# Patient Record
Sex: MALE | Race: WHITE | NOT HISPANIC OR LATINO | Employment: FULL TIME | URBAN - METROPOLITAN AREA
[De-identification: names, ages, dates, MRNs, and addresses within clinical notes are randomized per-mention and may not be internally consistent; named-entity substitution may affect disease eponyms.]

---

## 2017-01-09 ENCOUNTER — GENERIC CONVERSION - ENCOUNTER (OUTPATIENT)
Dept: OTHER | Facility: OTHER | Age: 57
End: 2017-01-09

## 2017-01-09 ENCOUNTER — APPOINTMENT (OUTPATIENT)
Dept: PHYSICAL THERAPY | Facility: CLINIC | Age: 57
End: 2017-01-09
Payer: COMMERCIAL

## 2017-01-09 PROCEDURE — 97162 PT EVAL MOD COMPLEX 30 MIN: CPT

## 2017-01-09 PROCEDURE — 97110 THERAPEUTIC EXERCISES: CPT

## 2017-01-09 PROCEDURE — 97140 MANUAL THERAPY 1/> REGIONS: CPT

## 2017-01-12 ENCOUNTER — APPOINTMENT (OUTPATIENT)
Dept: PHYSICAL THERAPY | Facility: CLINIC | Age: 57
End: 2017-01-12
Payer: COMMERCIAL

## 2017-01-16 ENCOUNTER — APPOINTMENT (OUTPATIENT)
Dept: PHYSICAL THERAPY | Facility: CLINIC | Age: 57
End: 2017-01-16
Payer: COMMERCIAL

## 2017-01-19 ENCOUNTER — APPOINTMENT (OUTPATIENT)
Dept: PHYSICAL THERAPY | Facility: CLINIC | Age: 57
End: 2017-01-19
Payer: COMMERCIAL

## 2017-01-19 PROCEDURE — 97110 THERAPEUTIC EXERCISES: CPT

## 2017-01-19 PROCEDURE — 97112 NEUROMUSCULAR REEDUCATION: CPT

## 2017-01-19 PROCEDURE — 97140 MANUAL THERAPY 1/> REGIONS: CPT

## 2017-01-23 ENCOUNTER — APPOINTMENT (OUTPATIENT)
Dept: PHYSICAL THERAPY | Facility: CLINIC | Age: 57
End: 2017-01-23
Payer: COMMERCIAL

## 2017-01-23 PROCEDURE — 97110 THERAPEUTIC EXERCISES: CPT

## 2017-01-23 PROCEDURE — 97140 MANUAL THERAPY 1/> REGIONS: CPT

## 2017-01-26 ENCOUNTER — APPOINTMENT (OUTPATIENT)
Dept: PHYSICAL THERAPY | Facility: CLINIC | Age: 57
End: 2017-01-26
Payer: COMMERCIAL

## 2017-01-26 PROCEDURE — 97140 MANUAL THERAPY 1/> REGIONS: CPT

## 2017-01-26 PROCEDURE — 97110 THERAPEUTIC EXERCISES: CPT

## 2017-01-30 ENCOUNTER — APPOINTMENT (OUTPATIENT)
Dept: PHYSICAL THERAPY | Facility: CLINIC | Age: 57
End: 2017-01-30
Payer: COMMERCIAL

## 2017-01-30 PROCEDURE — 97140 MANUAL THERAPY 1/> REGIONS: CPT

## 2017-01-30 PROCEDURE — 97110 THERAPEUTIC EXERCISES: CPT

## 2017-02-02 ENCOUNTER — APPOINTMENT (OUTPATIENT)
Dept: PHYSICAL THERAPY | Facility: CLINIC | Age: 57
End: 2017-02-02
Payer: COMMERCIAL

## 2017-02-06 ENCOUNTER — APPOINTMENT (OUTPATIENT)
Dept: PHYSICAL THERAPY | Facility: CLINIC | Age: 57
End: 2017-02-06
Payer: COMMERCIAL

## 2017-02-06 PROCEDURE — 97110 THERAPEUTIC EXERCISES: CPT

## 2017-02-06 PROCEDURE — 97140 MANUAL THERAPY 1/> REGIONS: CPT

## 2017-02-09 ENCOUNTER — APPOINTMENT (OUTPATIENT)
Dept: PHYSICAL THERAPY | Facility: CLINIC | Age: 57
End: 2017-02-09
Payer: COMMERCIAL

## 2017-02-10 ENCOUNTER — APPOINTMENT (OUTPATIENT)
Dept: PHYSICAL THERAPY | Facility: CLINIC | Age: 57
End: 2017-02-10
Payer: COMMERCIAL

## 2017-02-10 PROCEDURE — 97140 MANUAL THERAPY 1/> REGIONS: CPT

## 2017-02-10 PROCEDURE — 97110 THERAPEUTIC EXERCISES: CPT

## 2017-02-13 ENCOUNTER — APPOINTMENT (OUTPATIENT)
Dept: PHYSICAL THERAPY | Facility: CLINIC | Age: 57
End: 2017-02-13
Payer: COMMERCIAL

## 2017-02-16 ENCOUNTER — APPOINTMENT (OUTPATIENT)
Dept: PHYSICAL THERAPY | Facility: CLINIC | Age: 57
End: 2017-02-16
Payer: COMMERCIAL

## 2017-03-15 ENCOUNTER — GENERIC CONVERSION - ENCOUNTER (OUTPATIENT)
Dept: OTHER | Facility: OTHER | Age: 57
End: 2017-03-15

## 2017-05-15 DIAGNOSIS — Z12.5 ENCOUNTER FOR SCREENING FOR MALIGNANT NEOPLASM OF PROSTATE: ICD-10-CM

## 2017-05-15 DIAGNOSIS — R73.09 OTHER ABNORMAL GLUCOSE: ICD-10-CM

## 2017-05-23 ENCOUNTER — LAB CONVERSION - ENCOUNTER (OUTPATIENT)
Dept: OTHER | Facility: OTHER | Age: 57
End: 2017-05-23

## 2017-05-23 LAB
A/G RATIO (HISTORICAL): 2.2 (CALC) (ref 1–2.5)
ALBUMIN SERPL BCP-MCNC: 4.7 G/DL (ref 3.6–5.1)
ALP SERPL-CCNC: 78 U/L (ref 40–115)
ALT SERPL W P-5'-P-CCNC: 19 U/L (ref 9–46)
AST SERPL W P-5'-P-CCNC: 19 U/L (ref 10–35)
BASOPHILS # BLD AUTO: 0.4 %
BASOPHILS # BLD AUTO: 33 CELLS/UL (ref 0–200)
BILIRUB SERPL-MCNC: 1.4 MG/DL (ref 0.2–1.2)
BILIRUB UR QL STRIP: NEGATIVE
BUN SERPL-MCNC: 24 MG/DL (ref 7–25)
BUN/CREA RATIO (HISTORICAL): ABNORMAL (CALC) (ref 6–22)
CALCIUM SERPL-MCNC: 9.5 MG/DL (ref 8.6–10.3)
CHLORIDE SERPL-SCNC: 103 MMOL/L (ref 98–110)
CHOLEST SERPL-MCNC: 164 MG/DL (ref 125–200)
CHOLEST/HDLC SERPL: 2.5 (CALC)
CO2 SERPL-SCNC: 27 MMOL/L (ref 20–31)
COLOR UR: YELLOW
COMMENT (HISTORICAL): CLEAR
CREAT SERPL-MCNC: 1.26 MG/DL (ref 0.7–1.33)
DEPRECATED RDW RBC AUTO: 13.9 % (ref 11–15)
EGFR AFRICAN AMERICAN (HISTORICAL): 72 ML/MIN/1.73M2
EGFR-AMERICAN CALC (HISTORICAL): 62 ML/MIN/1.73M2
EOSINOPHIL # BLD AUTO: 0.8 %
EOSINOPHIL # BLD AUTO: 66 CELLS/UL (ref 15–500)
FECAL OCCULT BLOOD DIAGNOSTIC (HISTORICAL): NEGATIVE
GAMMA GLOBULIN (HISTORICAL): 2.1 G/DL (CALC) (ref 1.9–3.7)
GLUCOSE (HISTORICAL): 82 MG/DL (ref 65–99)
GLUCOSE (HISTORICAL): NEGATIVE
HBA1C MFR BLD HPLC: 4.8 % OF TOTAL HGB
HCT VFR BLD AUTO: 50.2 % (ref 38.5–50)
HDLC SERPL-MCNC: 66 MG/DL
HGB BLD-MCNC: 16.7 G/DL (ref 13.2–17.1)
KETONES UR STRIP-MCNC: ABNORMAL MG/DL
LDL CHOLESTEROL (HISTORICAL): 89 MG/DL (CALC)
LEUKOCYTE ESTERASE UR QL STRIP: NEGATIVE
LYMPHOCYTES # BLD AUTO: 1960 CELLS/UL (ref 850–3900)
LYMPHOCYTES # BLD AUTO: 23.9 %
MCH RBC QN AUTO: 29.6 PG (ref 27–33)
MCHC RBC AUTO-ENTMCNC: 33.2 G/DL (ref 32–36)
MCV RBC AUTO: 89 FL (ref 80–100)
MONOCYTES # BLD AUTO: 541 CELLS/UL (ref 200–950)
MONOCYTES (HISTORICAL): 6.6 %
NEUTROPHILS # BLD AUTO: 5601 CELLS/UL (ref 1500–7800)
NEUTROPHILS # BLD AUTO: 68.3 %
NITRITE UR QL STRIP: NEGATIVE
NON-HDL-CHOL (CHOL-HDL) (HISTORICAL): 98 MG/DL (CALC)
PH UR STRIP.AUTO: 5.5 [PH] (ref 5–8)
PLATELET # BLD AUTO: 208 THOUSAND/UL (ref 140–400)
PMV BLD AUTO: 8.1 FL (ref 7.5–12.5)
POTASSIUM SERPL-SCNC: 4.3 MMOL/L (ref 3.5–5.3)
PROSTATE SPECIFIC ANTIGEN TOTAL (HISTORICAL): 0.1 NG/ML
PROT UR STRIP-MCNC: NEGATIVE MG/DL
RBC # BLD AUTO: 5.64 MILLION/UL (ref 4.2–5.8)
SODIUM SERPL-SCNC: 141 MMOL/L (ref 135–146)
SP GR UR STRIP.AUTO: 1.02 (ref 1–1.03)
TOTAL PROTEIN (HISTORICAL): 6.8 G/DL (ref 6.1–8.1)
TRIGL SERPL-MCNC: 43 MG/DL
WBC # BLD AUTO: 8.2 THOUSAND/UL (ref 3.8–10.8)

## 2017-06-26 ENCOUNTER — GENERIC CONVERSION - ENCOUNTER (OUTPATIENT)
Dept: OTHER | Facility: OTHER | Age: 57
End: 2017-06-26

## 2017-08-14 ENCOUNTER — ALLSCRIPTS OFFICE VISIT (OUTPATIENT)
Dept: OTHER | Facility: OTHER | Age: 57
End: 2017-08-14

## 2017-11-21 ENCOUNTER — ALLSCRIPTS OFFICE VISIT (OUTPATIENT)
Dept: OTHER | Facility: OTHER | Age: 57
End: 2017-11-21

## 2017-11-22 NOTE — PROGRESS NOTES
Assessment    1  Acute bacterial bronchitis (466 0,041 9) (J20 8,B96 89)    Plan  Acute bacterial bronchitis    · Benzonatate 200 MG Oral Capsule; ONE PO Q8H PRN COUGH   · DrRx Zithromax Z-Nestor 250 MG #6 pill pack; 2 TABLETS PO TODAY AND ONETABLET PO DAILY THEREAFTER FOR 4 DAYS    Discussion/Summary    Acute bacterial bronchitis: Plan is to start Zithromax Z-Nestor and benzonatate for symptom relief  Medications were explained  Nidia Harman was asked to call back for any questions or problems and to expect improvement in about 2 days and then resolution of symptoms  Chief Complaint    1  Cough  patient c/o dry cough for two days      History of Present Illness  Cough: Kimberly Orta presents with complaints of cough  Associated symptoms include myalgias, but-- no dyspnea,-- no wheezing,-- no chills,-- no fever,-- no runny nose,-- no stuffy nose,-- no sore throat,-- no pleuritic chest pain,-- no chest pain,-- no vomiting,-- no heartburn,-- no postnasal drainage,-- no mouth breathing,-- no noisy breathing,-- no rapid breathing,-- no painful swallowing,-- no eye itching,-- no nose itching,-- no headache,-- no hemoptysis-- and-- no night sweats  HPI: Fatou Mcnally is here today with a 2 day history of increasing cough, without wheezing or shortness of breath without fever chills  He has had no nasal congestion, sinus congestion, sore throat or earache  He has no gastrointestinal complaints  He reports a loose cough but no sputum productionhe develops a bronchitis this time of year and usually probably response to Zithromax  Active Problems  1  Depression with anxiety (300 4) (F41 8)    Past Medical History  Active Problems And Past Medical History Reviewed: The active problems and past medical history were reviewed and updated today  Social History     · Former smoker (L45 88) (K37 103)  The social history was reviewed and updated today  The social history was reviewed and is unchanged  Current Meds   1   ClonazePAM 0 5 MG Oral Tablet; Therapy: [id number] to Recorded   2  Nortriptyline HCl - 50 MG Oral Capsule; TAKE 2 CAPSULES AT BEDTIME; Therapy: 21VMN1667 to Recorded    The medication list was reviewed and updated today  Allergies  1  No Known Drug Allergies    Vitals   Recorded: 21Nov2017 10:01AM   Temperature 98 F, Tympanic   Heart Rate 86   Systolic 861, LUE, Sitting   Diastolic 78, LUE, Sitting   Height 5 ft 8 in   Weight 186 lb 4 oz   BMI Calculated 28 32   BSA Calculated 1 98   O2 Saturation 98       Physical Exam   Constitutional Vital signs stable, afebrile in no acute distress with occasional short paroxysms of coughing  There is no dyspnea  Pulses regular  Cardiac exam is normal  Lungs are clear, other than rhonchi when he coughs  There are no focal decreased breath sounds  There is no supraclavicular adenopathy  ENT exam is also unremarkable  Future Appointments    Date/Time Provider Specialty Site   08/16/2018 03:20 PM LISANDRA Zee   Internal Medicine [facility]       Signatures   Electronically signed by : LISANDRA Murphy ; Nov 21 2017 10:14AM EST                       (Author)

## 2017-12-28 ENCOUNTER — ALLSCRIPTS OFFICE VISIT (OUTPATIENT)
Dept: OTHER | Facility: OTHER | Age: 57
End: 2017-12-28

## 2017-12-28 DIAGNOSIS — C61 MALIGNANT NEOPLASM OF PROSTATE (HCC): ICD-10-CM

## 2017-12-28 LAB
BILIRUB UR QL STRIP: NEGATIVE
CLARITY UR: NORMAL
COLOR UR: YELLOW
GLUCOSE (HISTORICAL): NEGATIVE
HGB UR QL STRIP.AUTO: NEGATIVE
KETONES UR STRIP-MCNC: NEGATIVE MG/DL
LEUKOCYTE ESTERASE UR QL STRIP: NEGATIVE
NITRITE UR QL STRIP: NEGATIVE
PH UR STRIP.AUTO: 5 [PH]
PROT UR STRIP-MCNC: NEGATIVE MG/DL
SP GR UR STRIP.AUTO: 1015
UROBILINOGEN UR QL STRIP.AUTO: 0.2
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
## 2018-01-12 NOTE — PROGRESS NOTES
Assessment    1  Encounter for preventive health examination (V70 0) (Z00 00)    Plan  PMH: Muscle strain of left upper back    · Naproxen 500 MG Oral Tablet  PMH: Strain of trapezius muscle, left, initial encounter    · Cyclobenzaprine HCl - 10 MG Oral Tablet    Discussion/Summary    Arturo is encouraged to continue his optimal lifestyle, and he continues once a year visits with his psychiatrist, with well compensated generalized anxiety including clonazepam therapy by psychiatry  He has good support from his wife since he lost his job and a good several package  I asked him to contact me if he is having a trouble with this transition  Echo was in one year  Chief Complaint  pt is here for annual physical      History of Present Illness  HPI: Lexy Anders is here for general preventative exam  He recently lost his job working for an [employer]  He has a good severance package and his wife is working full-time  He is a bit uneasy about this transition period but is coping well  He has good support from his wife  He is going to the gym regularly and eating well  He is up-to-date on preventive care including seeing his urologist twice year with history of ostial cancer in remission with a May 22 PSA of 0 1  He sees [facility] twice a year as his father has a history of melanoma  Lexy Anders is not had melanoma skin cancer  He wears sunscreen  He reports stable vision is up-to-date on eye care once year  He is up-to-date on dental care  His last colonoscopy was 3 years ago and he is on a 5 year recall list and GI review of systems is negative  Respiratory lab work was reviewed including hemoglobin A1c of 4 8, total cholesterol 164 with LDL 43, normal CBC other than hematocrit 50 2 most likely due to fasting, glucose of 82, GFR 62 and normal urinalysis other than mild ketones  CMP was normal other than mild elevation of bilirubin  We reviewed vaccines and he received Adacel today   Flu shot should be given later in the immunization season as flu vaccine will wear off and given now  Arturo reports excellent exercise tolerance including no chest pain or shortness of breath  Review systems also as above, all others negative  Active Problems    1  Depression with anxiety (300 4) (F41 8)   2  Encounter for prostate cancer screening (V76 44) (Z12 5)    Past Medical History    · History of Adenocarcinoma of prostate (80) (C61)   · History of colonic polyps (V12 72) (Z86 010)   · History of squamous cell carcinoma of skin (V10 83) (W44 769)    Surgical History    · History of Complete Colonoscopy   · History of Dermatological Surgery   · History of Knee Surgery Right   · History of Prostatectomy Radical    Family History  Mother    · Family history of coronary artery disease (V17 3) (Z80 55)  Father    · Family history of coronary artery disease (V17 3) (Z82 49)   · Family history of Melanoma    Social History    · Former smoker (R53 27) (I97 466)    Current Meds   1  ClonazePAM 0 5 MG Oral Tablet; Therapy: 14BCK2683 to Recorded   2  Cyclobenzaprine HCl - 10 MG Oral Tablet; TAKE 1 TABLET AT BEDTIME; Therapy: 62DPZ9364 to ([phone])  Requested for: 70ONE3230; Last   Rx:19Jan2017 Ordered   3  Naproxen 500 MG Oral Tablet; take 1 tablet by mouth every 12 hours if needed; Therapy: 83OMM7688 to (Evaluate:29Mar2017)  Requested for: 09XQT2021; Last   Rx:15Mar2017 Ordered    Allergies    1  No Known Drug Allergies    Vitals   Recorded: 87Aky9253 03:24PM   Heart Rate 83   Systolic 062   Diastolic 82   Height 5 ft 8 in   Weight 181 lb    BMI Calculated 27 52   BSA Calculated 1 96   O2 Saturation 98     Physical Exam    Constitutional   General appearance: No acute distress, well appearing and well nourished  Head and Face   Head and face: Normal     Palpation of the face and sinuses: No sinus tenderness  Eyes   Conjunctiva and lids: No erythema, swelling or discharge      Pupils and irises: Equal, round, reactive to light  Ears, Nose, Mouth, and Throat   External inspection of ears and nose: Normal     Otoscopic examination: Tympanic membranes translucent with normal light reflex  Canals patent without erythema  Hearing: Normal     Nasal mucosa, septum, and turbinates: Normal without edema or erythema  Lips, teeth, and gums: Normal, good dentition  Oropharynx: Normal with no erythema, edema, exudate or lesions  Neck   Neck: Supple, symmetric, trachea midline, no masses  Thyroid: Normal, no thyromegaly  Pulmonary   Respiratory effort: No increased work of breathing or signs of respiratory distress  Auscultation of lungs: Clear to auscultation  Cardiovascular   Auscultation of heart: Normal rate and rhythm, normal S1 and S2, no murmurs  Carotid pulses: 2+ bilaterally  Abdominal aorta: Normal     Pedal pulses: 2+ bilaterally  Peripheral vascular exam: Normal     Examination of extremities for edema and/or varicosities: Normal     Abdomen   Abdomen: Non-tender, no masses  Liver and spleen: No hepatomegaly or splenomegaly  Examination for hernias: No hernias appreciated  Lymphatic   Palpation of lymph nodes in neck: No lymphadenopathy  Palpation of lymph nodes in other areas: No lymphadenopathy  Musculoskeletal   Gait and station: Normal     Inspection/palpation of digits and nails: Normal without clubbing or cyanosis  Well-healed scar over the medial aspect of the left anterior knee, with degeneration noted the medial compartment, but good stability  Range of motion: Normal     Stability: Normal     Muscle strength/tone: Normal     Skin Hypertrichosis and other signs of sun exposure of the trunk and extremities, with benign hemangiomas, benign nevi, no rash or skin malignancy noted  Neurologic   Cranial nerves: Cranial nerves 2-12 intact  Cortical function: Normal mental status  Sensation: No sensory loss      Coordination: Normal finger to nose and heel to pierre     Psychiatric   Judgment and insight: Normal     Orientation to person, place and time: Normal     Recent and remote memory: Intact      Mood and affect: Normal        Signatures   Electronically signed by : LISANDRA Diaz ; Aug 14 2017  4:10PM EST                       (Author)

## 2018-01-15 VITALS
SYSTOLIC BLOOD PRESSURE: 136 MMHG | DIASTOLIC BLOOD PRESSURE: 78 MMHG | HEIGHT: 68 IN | BODY MASS INDEX: 28.23 KG/M2 | HEART RATE: 86 BPM | WEIGHT: 186.25 LBS | OXYGEN SATURATION: 98 % | TEMPERATURE: 98 F

## 2018-01-22 VITALS
OXYGEN SATURATION: 98 % | WEIGHT: 181 LBS | HEART RATE: 83 BPM | BODY MASS INDEX: 27.43 KG/M2 | HEIGHT: 68 IN | SYSTOLIC BLOOD PRESSURE: 124 MMHG | DIASTOLIC BLOOD PRESSURE: 82 MMHG

## 2018-01-23 VITALS
HEIGHT: 69 IN | DIASTOLIC BLOOD PRESSURE: 86 MMHG | WEIGHT: 187 LBS | BODY MASS INDEX: 27.7 KG/M2 | SYSTOLIC BLOOD PRESSURE: 136 MMHG

## 2018-05-04 ENCOUNTER — OFFICE VISIT (OUTPATIENT)
Dept: INTERNAL MEDICINE CLINIC | Facility: CLINIC | Age: 58
End: 2018-05-04
Payer: COMMERCIAL

## 2018-05-04 VITALS
DIASTOLIC BLOOD PRESSURE: 88 MMHG | BODY MASS INDEX: 26.2 KG/M2 | SYSTOLIC BLOOD PRESSURE: 122 MMHG | HEART RATE: 87 BPM | OXYGEN SATURATION: 97 % | HEIGHT: 70 IN | WEIGHT: 183 LBS

## 2018-05-04 DIAGNOSIS — J20.9 ACUTE BRONCHITIS, UNSPECIFIED ORGANISM: Primary | ICD-10-CM

## 2018-05-04 PROCEDURE — 99213 OFFICE O/P EST LOW 20 MIN: CPT | Performed by: INTERNAL MEDICINE

## 2018-05-04 RX ORDER — AZITHROMYCIN 250 MG/1
TABLET, FILM COATED ORAL
Qty: 6 TABLET | Refills: 0 | Status: SHIPPED | OUTPATIENT
Start: 2018-05-04 | End: 2018-05-08

## 2018-05-04 RX ORDER — CLONAZEPAM 0.5 MG/1
TABLET ORAL
COMMUNITY
Start: 2015-05-12 | End: 2019-09-13 | Stop reason: SDUPTHER

## 2018-05-04 RX ORDER — BENZONATATE 100 MG/1
100 CAPSULE ORAL 3 TIMES DAILY PRN
Qty: 20 CAPSULE | Refills: 0 | Status: SHIPPED | OUTPATIENT
Start: 2018-05-04 | End: 2018-06-20

## 2018-05-04 RX ORDER — NORTRIPTYLINE HYDROCHLORIDE 50 MG/1
1 CAPSULE ORAL EVERY OTHER DAY
COMMUNITY
Start: 2017-11-21 | End: 2021-09-21

## 2018-05-04 NOTE — PROGRESS NOTES
Assessment/Plan:         Diagnoses and all orders for this visit:    Acute bronchitis, unspecified organism  -     azithromycin (ZITHROMAX) 250 mg tablet; 2 pill first day and one pill 2-5 th day  -     benzonatate (TESSALON PERLES) 100 mg capsule; Take 1 capsule (100 mg total) by mouth 3 (three) times a day as needed for cough  Increase fluid intake, rest, saline gargles, saline nasal irrigation, NSAID/tylenol as tolerated  Follow up if symptoms getting worse or seek immediate medical help for emergency  Pt understands the plan  Other orders  -     nortriptyline (PAMELOR) 50 mg capsule; Take 2 capsules by mouth  -     clonazePAM (KlonoPIN) 0 5 mg tablet; Take by mouth        Subjective:      Patient ID: Alec Polanco is a 59 y o  male  Cough   This is a new problem  The current episode started in the past 7 days  The problem has been gradually worsening  The problem occurs constantly  The cough is productive of sputum  Pertinent negatives include no chest pain, chills, ear congestion, fever, nasal congestion, shortness of breath, sweats or wheezing  The following portions of the patient's history were reviewed and updated as appropriate: allergies, current medications, past medical history, past social history and problem list     Review of Systems   Constitutional: Negative for chills and fever  Respiratory: Positive for cough  Negative for shortness of breath and wheezing  Cardiovascular: Negative for chest pain  Neurological: Negative for dizziness  Objective:      /88 (BP Location: Left arm, Patient Position: Sitting, Cuff Size: Standard)   Pulse 87   Ht 5' 9 6" (1 768 m)   Wt 83 kg (183 lb)   SpO2 97%   BMI 26 56 kg/m²          Physical Exam   Constitutional: He appears well-nourished  No distress  HENT:   Mouth/Throat: No oropharyngeal exudate     Throat erythematous   postnasal drainage  Negative facial pressure   Cardiovascular: Normal rate, regular rhythm and normal heart sounds  Pulmonary/Chest: Effort normal and breath sounds normal  No respiratory distress  He has no wheezes  He has no rales

## 2018-06-20 ENCOUNTER — OFFICE VISIT (OUTPATIENT)
Dept: INTERNAL MEDICINE CLINIC | Facility: CLINIC | Age: 58
End: 2018-06-20
Payer: COMMERCIAL

## 2018-06-20 VITALS
OXYGEN SATURATION: 98 % | HEIGHT: 70 IN | BODY MASS INDEX: 27.06 KG/M2 | DIASTOLIC BLOOD PRESSURE: 82 MMHG | SYSTOLIC BLOOD PRESSURE: 112 MMHG | HEART RATE: 72 BPM | WEIGHT: 189 LBS | TEMPERATURE: 98.5 F

## 2018-06-20 DIAGNOSIS — J20.9 ACUTE BRONCHITIS, UNSPECIFIED ORGANISM: Primary | ICD-10-CM

## 2018-06-20 PROCEDURE — 99213 OFFICE O/P EST LOW 20 MIN: CPT | Performed by: INTERNAL MEDICINE

## 2018-06-20 PROCEDURE — 3008F BODY MASS INDEX DOCD: CPT | Performed by: INTERNAL MEDICINE

## 2018-06-20 RX ORDER — AZITHROMYCIN 250 MG/1
TABLET, FILM COATED ORAL
Qty: 6 TABLET | Refills: 0 | Status: SHIPPED | OUTPATIENT
Start: 2018-06-20 | End: 2018-06-24

## 2018-06-20 RX ORDER — CEPHALEXIN 500 MG/1
CAPSULE ORAL
COMMUNITY
Start: 2018-06-14 | End: 2018-06-28 | Stop reason: ALTCHOICE

## 2018-06-20 RX ORDER — BENZONATATE 100 MG/1
100 CAPSULE ORAL 3 TIMES DAILY PRN
Qty: 20 CAPSULE | Refills: 0 | Status: SHIPPED | OUTPATIENT
Start: 2018-06-20 | End: 2018-08-16 | Stop reason: ALTCHOICE

## 2018-06-20 NOTE — PROGRESS NOTES
Assessment/Plan:    No problem-specific Assessment & Plan notes found for this encounter  Diagnoses and all orders for this visit:    Acute bronchitis, unspecified organism  -     azithromycin (ZITHROMAX) 250 mg tablet; 2 pill first day and one pill 2-5 th day  -     benzonatate (TESSALON PERLES) 100 mg capsule; Take 1 capsule (100 mg total) by mouth 3 (three) times a day as needed for cough      Increase fluid intake, rest, saline gargles, saline nasal irrigation, NSAID/tylenol as tolerated  Follow up if symptoms getting worse or seek immediate medical help for emergency  Pt understands the plan  Other orders  -     cephalexin (KEFLEX) 500 mg capsule;         Subjective:      Patient ID: Arleen Alejandro is a 59 y o  male  Cough   This is a new problem  Episode onset: 4 days ago  The problem has been gradually worsening  The problem occurs constantly  The cough is non-productive  Associated symptoms include postnasal drip and rhinorrhea  Pertinent negatives include no chest pain, chills, fever, sore throat, shortness of breath or wheezing  Associated symptoms comments:  Feels  tired  The following portions of the patient's history were reviewed and updated as appropriate: allergies, current medications, past medical history, past social history, past surgical history and problem list     Review of Systems   Constitutional: Positive for fatigue  Negative for chills and fever  HENT: Positive for congestion, postnasal drip and rhinorrhea  Negative for sore throat  Respiratory: Positive for cough  Negative for shortness of breath and wheezing  Cardiovascular: Negative for chest pain  Gastrointestinal: Negative for diarrhea  Neurological: Negative for dizziness           Objective:      /82 (BP Location: Left arm, Patient Position: Sitting, Cuff Size: Standard)   Pulse 72   Temp 98 5 °F (36 9 °C)   Ht 5' 9 6" (1 768 m)   Wt 85 7 kg (189 lb)   SpO2 98%   BMI 27 43 kg/m² Physical Exam   Constitutional: No distress  Sounds congested   HENT:   Minimal erythema throat   Eyes: Conjunctivae are normal    Cardiovascular: Normal rate, regular rhythm and normal heart sounds  No murmur heard  Pulmonary/Chest: Effort normal and breath sounds normal  No respiratory distress  He has no wheezes  He has no rales  Neurological: He is alert

## 2018-06-22 LAB — PSA SERPL-MCNC: 0.1 NG/ML

## 2018-06-28 ENCOUNTER — OFFICE VISIT (OUTPATIENT)
Dept: UROLOGY | Facility: MEDICAL CENTER | Age: 58
End: 2018-06-28
Payer: COMMERCIAL

## 2018-06-28 VITALS
DIASTOLIC BLOOD PRESSURE: 82 MMHG | BODY MASS INDEX: 26.92 KG/M2 | WEIGHT: 188 LBS | SYSTOLIC BLOOD PRESSURE: 120 MMHG | HEIGHT: 70 IN

## 2018-06-28 DIAGNOSIS — C61 MALIGNANT NEOPLASM OF PROSTATE (HCC): ICD-10-CM

## 2018-06-28 DIAGNOSIS — C61 MALIGNANT NEOPLASM OF PROSTATE (HCC): Primary | ICD-10-CM

## 2018-06-28 LAB
SL AMB  POCT GLUCOSE, UA: NEGATIVE
SL AMB LEUKOCYTE ESTERASE,UA: NEGATIVE
SL AMB POCT BILIRUBIN,UA: NEGATIVE
SL AMB POCT BLOOD,UA: NEGATIVE
SL AMB POCT CLARITY,UA: CLEAR
SL AMB POCT COLOR,UA: YELLOW
SL AMB POCT KETONES,UA: NEGATIVE
SL AMB POCT NITRITE,UA: NEGATIVE
SL AMB POCT PH,UA: 5
SL AMB POCT SPECIFIC GRAVITY,UA: 1.02
SL AMB POCT URINE PROTEIN: NEGATIVE
SL AMB POCT UROBILINOGEN: 0.2

## 2018-06-28 PROCEDURE — 99212 OFFICE O/P EST SF 10 MIN: CPT | Performed by: UROLOGY

## 2018-06-28 PROCEDURE — 81003 URINALYSIS AUTO W/O SCOPE: CPT | Performed by: UROLOGY
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
## 2018-06-28 NOTE — LETTER
2018     Kamala Bucio MD  [id number] [place] [address]    Patient: Sumit Alonzo   YOB: 1960   Date of Visit: 2018       Dear Dr Reggie Israel: Thank you for referring Richard Freeman to me for evaluation  Below are my notes for this consultation  If you have questions, please do not hesitate to call me  I look forward to following your patient along with you  Sincerely,        Jovany Brownlee MD        CC: No Recipients  Jovany Brownlee MD  2018  2:48 PM  Sign at close encounter  [address] [facility]  [address], [address] [place] [phone]  www  [facility]  org      NAME: Sumit Alonzo  AGE: 59 y o  SEX: male  : 1960   MRN: [id number]    DATE: 2018  TIME: 2:39 PM    Assessment and Plan:  CAP- stable PSA 0 1 10 yrs after radical prostatectomy, PT2C disease  F/U 1 year with PSA, and check one in 6 months, result to patient  Chief Complaint     Chief Complaint   Patient presents with    Prostate Cancer     6 mo  ck       History of Present Illness   Prostate cancer:Status post radical retropubic prostatectomy 2008 and pathology showed Nya 6 bilaterally involving 60% of gland, negative margins pT2c  PSAs have remained at 0 1- last undetectable one was   Preop PSA was 9  9  He is doing well  Has occ  mild RAFA  PSA 2018 remains 0 1  The following portions of the patient's history were reviewed and updated as appropriate: allergies, current medications, past family history, past medical history, past social history, past surgical history and problem list     Review of Systems   Review of Systems   Genitourinary: Negative  Active Problem List   There is no problem list on file for this patient        Objective   /82   Ht 5' 9 6" (1 768 m)   Wt 85 3 kg (188 lb)   BMI 27 29 kg/m²      Physical Exam        Current Medications Current Outpatient Prescriptions:     clonazePAM (KlonoPIN) 0 5 mg tablet, Take by mouth, Disp: , Rfl:     benzonatate (TESSALON PERLES) 100 mg capsule, Take 1 capsule (100 mg total) by mouth 3 (three) times a day as needed for cough, Disp: 20 capsule, Rfl: 0    nortriptyline (PAMELOR) 50 mg capsule, Take 2 capsules by mouth, Disp: , Rfl:         Norberto Silva MD

## 2018-08-08 ENCOUNTER — TELEPHONE (OUTPATIENT)
Dept: INTERNAL MEDICINE CLINIC | Facility: CLINIC | Age: 58
End: 2018-08-08

## 2018-08-08 DIAGNOSIS — F32.A DEPRESSION, UNSPECIFIED DEPRESSION TYPE: ICD-10-CM

## 2018-08-08 DIAGNOSIS — Z85.46 HISTORY OF PROSTATE CANCER: Primary | ICD-10-CM

## 2018-08-08 NOTE — TELEPHONE ENCOUNTER
Pt has a physical with you next Thursday and is requesting bloodwork scripts so he can have his bloodwork done prior to the appointment  Please advise

## 2018-08-15 LAB
ALBUMIN SERPL-MCNC: 4.6 G/DL (ref 3.6–5.1)
ALBUMIN/GLOB SERPL: 2.2 (CALC) (ref 1–2.5)
ALP SERPL-CCNC: 72 U/L (ref 40–115)
ALT SERPL-CCNC: 17 U/L (ref 9–46)
APPEARANCE UR: CLEAR
AST SERPL-CCNC: 15 U/L (ref 10–35)
BACTERIA UR CULT: NORMAL
BACTERIA UR QL AUTO: NORMAL /HPF
BASOPHILS # BLD AUTO: 48 CELLS/UL (ref 0–200)
BASOPHILS NFR BLD AUTO: 0.7 %
BILIRUB SERPL-MCNC: 1.3 MG/DL (ref 0.2–1.2)
BILIRUB UR QL STRIP: NEGATIVE
BUN SERPL-MCNC: 25 MG/DL (ref 7–25)
BUN/CREAT SERPL: ABNORMAL (CALC) (ref 6–22)
CALCIUM SERPL-MCNC: 9.7 MG/DL (ref 8.6–10.3)
CHLORIDE SERPL-SCNC: 104 MMOL/L (ref 98–110)
CHOLEST SERPL-MCNC: 170 MG/DL
CHOLEST/HDLC SERPL: 2.8 (CALC)
CO2 SERPL-SCNC: 27 MMOL/L (ref 20–32)
COLOR UR: YELLOW
CREAT SERPL-MCNC: 1.29 MG/DL (ref 0.7–1.33)
EOSINOPHIL # BLD AUTO: 136 CELLS/UL (ref 15–500)
EOSINOPHIL NFR BLD AUTO: 2 %
ERYTHROCYTE [DISTWIDTH] IN BLOOD BY AUTOMATED COUNT: 12.4 % (ref 11–15)
GLOBULIN SER CALC-MCNC: 2.1 G/DL (CALC) (ref 1.9–3.7)
GLUCOSE SERPL-MCNC: 99 MG/DL (ref 65–99)
GLUCOSE UR QL STRIP: NEGATIVE
HCT VFR BLD AUTO: 48.8 % (ref 38.5–50)
HDLC SERPL-MCNC: 61 MG/DL
HGB BLD-MCNC: 16.5 G/DL (ref 13.2–17.1)
HGB UR QL STRIP: NEGATIVE
HYALINE CASTS #/AREA URNS LPF: NORMAL /LPF
KETONES UR QL STRIP: NEGATIVE
LDLC SERPL CALC-MCNC: 96 MG/DL (CALC)
LEUKOCYTE ESTERASE UR QL STRIP: NEGATIVE
LYMPHOCYTES # BLD AUTO: 2244 CELLS/UL (ref 850–3900)
LYMPHOCYTES NFR BLD AUTO: 33 %
MCH RBC QN AUTO: 30.3 PG (ref 27–33)
MCHC RBC AUTO-ENTMCNC: 33.8 G/DL (ref 32–36)
MCV RBC AUTO: 89.5 FL (ref 80–100)
MONOCYTES # BLD AUTO: 510 CELLS/UL (ref 200–950)
MONOCYTES NFR BLD AUTO: 7.5 %
NEUTROPHILS # BLD AUTO: 3862 CELLS/UL (ref 1500–7800)
NEUTROPHILS NFR BLD AUTO: 56.8 %
NITRITE UR QL STRIP: NEGATIVE
NONHDLC SERPL-MCNC: 109 MG/DL (CALC)
PH UR STRIP: NORMAL [PH] (ref 5–8)
PLATELET # BLD AUTO: 215 THOUSAND/UL (ref 140–400)
PMV BLD REES-ECKER: 9.7 FL (ref 7.5–12.5)
POTASSIUM SERPL-SCNC: 4.7 MMOL/L (ref 3.5–5.3)
PROT SERPL-MCNC: 6.7 G/DL (ref 6.1–8.1)
PROT UR QL STRIP: NEGATIVE
PSA SERPL-MCNC: 0.1 NG/ML
RBC # BLD AUTO: 5.45 MILLION/UL (ref 4.2–5.8)
RBC #/AREA URNS HPF: NORMAL /HPF
SL AMB EGFR AFRICAN AMERICAN: 70 ML/MIN/1.73M2
SL AMB EGFR NON AFRICAN AMERICAN: 60 ML/MIN/1.73M2
SODIUM SERPL-SCNC: 141 MMOL/L (ref 135–146)
SP GR UR STRIP: 1.02 (ref 1–1.03)
SQUAMOUS #/AREA URNS HPF: NORMAL /HPF
TRIGL SERPL-MCNC: 50 MG/DL
WBC # BLD AUTO: 6.8 THOUSAND/UL (ref 3.8–10.8)
WBC #/AREA URNS HPF: NORMAL /HPF

## 2018-08-16 ENCOUNTER — OFFICE VISIT (OUTPATIENT)
Dept: INTERNAL MEDICINE CLINIC | Facility: CLINIC | Age: 58
End: 2018-08-16
Payer: COMMERCIAL

## 2018-08-16 VITALS
HEIGHT: 70 IN | HEART RATE: 74 BPM | OXYGEN SATURATION: 96 % | DIASTOLIC BLOOD PRESSURE: 90 MMHG | TEMPERATURE: 96.8 F | SYSTOLIC BLOOD PRESSURE: 114 MMHG | BODY MASS INDEX: 26.34 KG/M2 | WEIGHT: 184 LBS

## 2018-08-16 DIAGNOSIS — Z00.00 WELL ADULT EXAM: Primary | ICD-10-CM

## 2018-08-16 PROBLEM — N52.31 ERECTILE DYSFUNCTION FOLLOWING RADICAL PROSTATECTOMY: Status: ACTIVE | Noted: 2017-12-28

## 2018-08-16 PROCEDURE — 99396 PREV VISIT EST AGE 40-64: CPT | Performed by: INTERNAL MEDICINE

## 2018-08-16 NOTE — PROGRESS NOTES
Assessment/Plan   Problem List Items Addressed This Visit     None      Visit Diagnoses     Well adult exam    -  Primary      Time is doing well, and since shelter has maintain is longstanding routine of 35 years of going to the gym at 5:00 a m  5 days a week  He has excellent exercise tolerance  Lifestyle is overall very healthy  Preventive care is up-to-date and next colonoscopy is due in 1 year, which is a 5 year recall from negative colonoscopy 4 years ago with previous polyps found before this  GI review of systems is negative  His vision is stable with glasses and is up-to-date on eye care  Dental care is up-to-date  He sees his new urologist, Dr Mimi Harris, once year with history of prostate cancer in remission with August 14th PSA stable at 0 1  He continues to see Dr Lay twice a year for melanoma surveillance with father with history of melanoma and no personal history of melanoma  Today's exam was also benign as well  We reviewed his annual lab work, including normal CBC, normal urinalysis, CMP normal except for bilirubin of 1 3 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, with glucose of 99  Lipid panel also is ideal and was discussed, total cholesterol 170, LDL 96, HDL 61 and triglycerides of 50  Longstanding treatment for depression anxiety continues through his psychiatrist and there is no depression and this is in long-term remission  Despite 2 previous open surgeries of the right knee in the 1990s for knee injury, with removal of medial meniscal cartilage twice, he has a stable knee without pain  He has mild low back stiffness with remote history of lumbar laminectomy without radicular pain  Ankle stable status post ankle surgery as well  He will continue low-impact exercise  Essential hypertension has resolved with no medications several years, with repeat blood pressure of 118/76 today  We discussed follow-up which can be in 1 year plus as needed      Subjective Patient ID: Litzy Richter is a 59 y o  male  Fatou Mcnally is here for his annual preventive exam and review of medical problems  He is semi retired and is exercising regularly  Quality of life is good  Vitals:    08/16/18 1513   BP: 114/90   Pulse: 74   Temp: (!) 96 8 °F (36 °C)   SpO2: 96%     HPI   We reviewed his recent lab work of August 14th and is ongoing follow-up with specialist   This includes follow-up Psychiatry and no change in medications for endogenous anxiety with depression  Urology and dermatology follow-up as well as eye and dental care are up-to-date  Lifestyle remains optimal   He is reporting no new problems  The following portions of the patient's history were reviewed and updated as appropriate: allergies, current medications, past family history, past medical history, past social history, past surgical history and problem list     Review of Systems minor stiffness in low back and right knee, no instability or pain of these joints  No instability of the ankle as well  No ankle pain  No exertional chest pain or shortness of breath or declining exercise tolerance  Also, see above, all others negative  Objective   Physical Exam   Vital signs stable with regular pulse  HEENT exam is unremarkable  Skin without rash or skin malignancy  He is fair-skinned blue eyed with freckling the skin and some deep pigmentation and signs of some related skin damage  Hearing grossly normal   There are no neurologic abnormalities  Affect is normal   Cognitive status is intact  Head neck without mass or adenopathy, neck supple without trauma  No JVD is noted  There are no carotid bruits in carotid pulses are normal   On inspection and palpation thyroid is normal   Trachea midline without stridor  There is no supraclavicular adenopathy  Lungs clear throughout to auscultation    Cardiac:  Regular rate and rhythm, normal S1 and S2, no murmur, no S4 or S3, PMI fifth intercostal space midclavicular line   Back:  No CVA mass or CVA tenderness  Lungs normal to percussion posteriorly  There is a well-healed lower lumbar scar, good range of motion of low back and there is no spinal tenderness  Abdomen:  Nondistended without hernia or periumbilical adenopathy, normal bowel sounds, soft and nontender without masses bruits organomegaly present  Extremities: There is no edema clubbing or cyanosis and there is no calf tenderness and there are no phlebitic findings  The peripheral arterial circulation is symmetric normal   There well-healed sagittal scars of the medial right knee, good range of motion and joint spaces markedly narrowed in the leg medial left knee    Gait is stable normal         Patient Active Problem List   Diagnosis    Depression with anxiety    Erectile dysfunction following radical prostatectomy    Essential hypertension     Current Outpatient Prescriptions:     clonazePAM (KlonoPIN) 0 5 mg tablet, Take by mouth, Disp: , Rfl:     nortriptyline (PAMELOR) 50 mg capsule, Take 2 capsules by mouth, Disp: , Rfl:

## 2019-01-04 LAB — PSA SERPL-MCNC: 0.2 NG/ML

## 2019-01-09 ENCOUNTER — TELEPHONE (OUTPATIENT)
Dept: UROLOGY | Facility: MEDICAL CENTER | Age: 59
End: 2019-01-09

## 2019-01-09 DIAGNOSIS — Z85.46 HISTORY OF PROSTATE CANCER: Primary | ICD-10-CM

## 2019-01-09 NOTE — TELEPHONE ENCOUNTER
----- Message from Noemi Cogan, MD sent at 1/7/2019  9:46 AM EST -----  Please call the patient regarding his abnormal result- his PSA has risen to 0 2, I would like to recheck this in 3 months rather than in 6 months  I would like to see him in the office in 3 months as well

## 2019-01-09 NOTE — TELEPHONE ENCOUNTER
Spoke with the patient; His PSA was normal at 0 2  However, ever since the treatment of his prostate cancer in 2009, it's always been 0 1  As a precaution, Dr Selene Ortez would like another PSA done in 3 months as well as an office visit   The order has been entered and mailed to the patient and his appointment has been set for 04/15/2019 at 9:00 AM

## 2019-04-02 LAB — PSA SERPL-MCNC: 0.1 NG/ML

## 2019-04-15 ENCOUNTER — OFFICE VISIT (OUTPATIENT)
Dept: UROLOGY | Facility: MEDICAL CENTER | Age: 59
End: 2019-04-15
Payer: COMMERCIAL

## 2019-04-15 VITALS
DIASTOLIC BLOOD PRESSURE: 76 MMHG | WEIGHT: 191 LBS | BODY MASS INDEX: 27.72 KG/M2 | HEART RATE: 70 BPM | SYSTOLIC BLOOD PRESSURE: 110 MMHG

## 2019-04-15 DIAGNOSIS — Z85.46 HISTORY OF PROSTATE CANCER: Primary | ICD-10-CM

## 2019-04-15 LAB
SL AMB  POCT GLUCOSE, UA: NORMAL
SL AMB LEUKOCYTE ESTERASE,UA: NORMAL
SL AMB POCT BILIRUBIN,UA: NORMAL
SL AMB POCT BLOOD,UA: NORMAL
SL AMB POCT CLARITY,UA: CLEAR
SL AMB POCT COLOR,UA: YELLOW
SL AMB POCT KETONES,UA: NORMAL
SL AMB POCT NITRITE,UA: NORMAL
SL AMB POCT PH,UA: 5.5
SL AMB POCT SPECIFIC GRAVITY,UA: 1.03
SL AMB POCT URINE PROTEIN: NORMAL
SL AMB POCT UROBILINOGEN: 0.2

## 2019-04-15 PROCEDURE — 81003 URINALYSIS AUTO W/O SCOPE: CPT | Performed by: UROLOGY

## 2019-04-15 PROCEDURE — 99213 OFFICE O/P EST LOW 20 MIN: CPT | Performed by: UROLOGY

## 2019-04-15 RX ORDER — CLONAZEPAM 1 MG/1
1 TABLET ORAL DAILY PRN
Refills: 0 | COMMUNITY
Start: 2019-01-16

## 2019-05-28 DIAGNOSIS — I10 ESSENTIAL HYPERTENSION: Primary | ICD-10-CM

## 2019-05-28 DIAGNOSIS — F41.8 DEPRESSION WITH ANXIETY: ICD-10-CM

## 2019-05-28 DIAGNOSIS — Z85.46 HISTORY OF PROSTATE CANCER: ICD-10-CM

## 2019-09-13 ENCOUNTER — OFFICE VISIT (OUTPATIENT)
Dept: INTERNAL MEDICINE CLINIC | Facility: CLINIC | Age: 59
End: 2019-09-13
Payer: COMMERCIAL

## 2019-09-13 VITALS
OXYGEN SATURATION: 99 % | HEIGHT: 69 IN | DIASTOLIC BLOOD PRESSURE: 80 MMHG | TEMPERATURE: 98.6 F | BODY MASS INDEX: 26.98 KG/M2 | HEART RATE: 103 BPM | WEIGHT: 182.2 LBS | SYSTOLIC BLOOD PRESSURE: 122 MMHG

## 2019-09-13 DIAGNOSIS — Z23 NEED FOR VACCINATION AGAINST STREPTOCOCCUS PNEUMONIAE USING PNEUMOCOCCAL CONJUGATE VACCINE 13: ICD-10-CM

## 2019-09-13 DIAGNOSIS — S80.212A ABRASION OF LEFT KNEE, INITIAL ENCOUNTER: ICD-10-CM

## 2019-09-13 DIAGNOSIS — I10 ESSENTIAL HYPERTENSION: Primary | ICD-10-CM

## 2019-09-13 DIAGNOSIS — Z00.00 WELL ADULT EXAM: Primary | ICD-10-CM

## 2019-09-13 PROCEDURE — 90715 TDAP VACCINE 7 YRS/> IM: CPT | Performed by: INTERNAL MEDICINE

## 2019-09-13 PROCEDURE — 99396 PREV VISIT EST AGE 40-64: CPT | Performed by: INTERNAL MEDICINE

## 2019-09-13 PROCEDURE — 90472 IMMUNIZATION ADMIN EACH ADD: CPT | Performed by: INTERNAL MEDICINE

## 2019-09-13 PROCEDURE — 90670 PCV13 VACCINE IM: CPT | Performed by: INTERNAL MEDICINE

## 2019-09-13 PROCEDURE — 90471 IMMUNIZATION ADMIN: CPT | Performed by: INTERNAL MEDICINE

## 2019-09-13 NOTE — PROGRESS NOTES
Assessment/Plan:    No problem-specific Assessment & Plan notes found for this encounter  Diagnoses and all orders for this visit:    Well adult exam          Subjective:      Patient ID: Chidi Arriaga is a 59 y o  male who is here today for annual checkup  Kalen Potts sees dermatology twice year with a history of melanoma in his family  He has had no melanoma and protect his skin again sun exposure  Eye care is up-to-date with good vision with contact lenses  My notes that he may be due for colonoscopy this year with last colonoscopy apparently about 5 years ago, with history of colon polyps, negative GI review of systems with history of hemorrhoids, and Kalen Potts will be calling Dr Lisbet Paul, his gastroenterologist about timing of next colonoscopy  Kalen Potts sees his urologist once year and the April 15th note was reviewed  There is a history of successfully treated prostate cancer and PSA was 0 1 in April, and when I drew his annual labs last month it was also optimal     Last months labs reviewed including no change in chronic mild decreased GFR, not taking nephrotoxin medicines in years, and he tends to not drink water and goes to the gym right before he has his lab work drawn so there is probably some element of pre renal azotemia which I discussed  Plan is to stay well hydrated avoiding nephrotoxin medications, labs do not need to be repeated in till next year  His fasting glucose of 104 I do not feel this is significant issue, I do not feel he is prediabetic  He exercises regularly, weight is stable is working on reducing weight further and further decreasing calories  Lipids are optimal was high HDL cholesterol 69 and this was discussed, and therefore LDL less than 130/100 is not a clinical issue  He has an early shelter as his company gave him an early shelter package in the last year and he is enjoying shelter  He reports no new problems      We discussed vaccines and he already has had the new shingles vaccine series, and had abrasions of his legs today was given a T8 P booster, and received Prevnar today with recommendation for Pneumovax in 1 year  He already received a flu shot at his local pharmacy  HPI  No acute problems are reported  I encouraged Isabel Dyer to continue healthy lifestyle, further reduce weight, and next visit can be in 1 year at time of annual exam   The following portions of the patient's history were reviewed and updated as appropriate: allergies, current medications, past family history, past medical history, past social history, past surgical history and problem list     Review of Systems  as above, all others negative  Objective:      /80 (BP Location: Left arm, Patient Position: Sitting, Cuff Size: Standard)   Pulse 103   Temp 98 6 °F (37 °C)   Ht 5' 9" (1 753 m)   Wt 82 6 kg (182 lb 3 2 oz)   SpO2 99%   BMI 26 91 kg/m²      Wt Readings from Last 3 Encounters:   09/13/19 82 6 kg (182 lb 3 2 oz)   04/15/19 86 6 kg (191 lb)   08/16/18 83 5 kg (184 lb)     Temp Readings from Last 3 Encounters:   09/13/19 98 6 °F (37 °C)   08/16/18 (!) 96 8 °F (36 °C) (Tympanic)   06/20/18 98 5 °F (36 9 °C)     BP Readings from Last 3 Encounters:   09/13/19 122/80   04/15/19 110/76   08/16/18 114/90     Pulse Readings from Last 3 Encounters:   09/13/19 103   04/15/19 70   08/16/18 74        Physical Exam    Vital signs stable, very pleasant gentleman in no distress  Cognitive status normal, affect normal   Skin without rash or malignancy  HEENT exam notable for wearing contact lenses, otherwise unremarkable  Head neck without mass or adenopathy  There is no JV D  There are no carotid bruits in carotid pulses are normal   Trachea is midline without stridor, thyroid exam normal on inspection and palpation  There is no supraclavicular adenopathy  Lungs are clear to auscultation    Cardiac:  Regular rate and rhythm, with repeat pulse of 88, normal S1 and S2, no S4 or S3, no murmur or rub, PMI fifth intercostal space midclavicular line  Abdomen:  Nondistended, no hernia or periumbilical adenopathy, normal bowel sounds, soft and nontender without masses bruits organomegaly  There is no change in chronic mycotic toenail changes and mild tinea pedis  Peripheral circulation intact  Joint function is normal with mild degenerative changes noted in high use joints  There are no tophi  Gait is normal   There are no gross neurologic abnormalities  There is no edema  Skin of right lower extremity notable for multiple shallow abrasions, including 1 brain new abrasion with minor bleeding  Patient Active Problem List   Diagnosis    Depression with anxiety    Erectile dysfunction following radical prostatectomy    Essential hypertension       Current Outpatient Medications on File Prior to Visit   Medication Sig Dispense Refill    clonazePAM (KlonoPIN) 1 mg tablet Take 1 mg by mouth daily as needed  0    nortriptyline (PAMELOR) 50 mg capsule Take 2 capsules by mouth      [DISCONTINUED] clonazePAM (KlonoPIN) 0 5 mg tablet Take by mouth       No current facility-administered medications on file prior to visit

## 2019-09-18 ENCOUNTER — OFFICE VISIT (OUTPATIENT)
Dept: INTERNAL MEDICINE CLINIC | Facility: CLINIC | Age: 59
End: 2019-09-18
Payer: COMMERCIAL

## 2019-09-18 VITALS
BODY MASS INDEX: 28.08 KG/M2 | DIASTOLIC BLOOD PRESSURE: 88 MMHG | HEART RATE: 92 BPM | TEMPERATURE: 99.9 F | HEIGHT: 69 IN | RESPIRATION RATE: 16 BRPM | WEIGHT: 189.6 LBS | SYSTOLIC BLOOD PRESSURE: 133 MMHG

## 2019-09-18 DIAGNOSIS — J06.9 UPPER RESPIRATORY TRACT INFECTION, UNSPECIFIED TYPE: Primary | ICD-10-CM

## 2019-09-18 PROCEDURE — 3008F BODY MASS INDEX DOCD: CPT | Performed by: INTERNAL MEDICINE

## 2019-09-18 PROCEDURE — 99213 OFFICE O/P EST LOW 20 MIN: CPT | Performed by: INTERNAL MEDICINE

## 2019-09-18 RX ORDER — BENZONATATE 100 MG/1
100 CAPSULE ORAL 3 TIMES DAILY PRN
Qty: 20 CAPSULE | Refills: 0 | Status: SHIPPED | OUTPATIENT
Start: 2019-09-18 | End: 2020-08-04 | Stop reason: ALTCHOICE

## 2019-09-18 RX ORDER — AZITHROMYCIN 250 MG/1
TABLET, FILM COATED ORAL
Qty: 6 TABLET | Refills: 0 | Status: SHIPPED | OUTPATIENT
Start: 2019-09-18 | End: 2019-09-23

## 2019-09-18 RX ORDER — FLUTICASONE PROPIONATE 50 MCG
1 SPRAY, SUSPENSION (ML) NASAL DAILY
Qty: 1 BOTTLE | Refills: 1 | Status: SHIPPED | OUTPATIENT
Start: 2019-09-18 | End: 2020-08-04 | Stop reason: ALTCHOICE

## 2019-09-18 NOTE — PROGRESS NOTES
Assessment/Plan:  Exam is benign with no acute signs of a bacterial infection at this time  Oxygen saturations 98 percent on room air  I advised that this is most likely a viral infection, symptomatic treatment with Flonase and Tessalon for cough in nasal congestion and runny nose  If symptoms do not improve in the next 2-3 days, he could start the Z-Nestor  A written prescription of the azithromycin was given since that would be around the weekend  Diagnoses and all orders for this visit:    Upper respiratory tract infection, unspecified type  -     benzonatate (TESSALON PERLES) 100 mg capsule; Take 1 capsule (100 mg total) by mouth 3 (three) times a day as needed for cough  -     fluticasone (FLONASE) 50 mcg/act nasal spray; 1 spray into each nostril daily  -     azithromycin (ZITHROMAX) 250 mg tablet; Take 2 today then 1 daily for 4 days          Subjective:   Chief Complaint   Patient presents with    Cough        Patient ID: Drake Bowman is a 59 y o  male  He comes in for an acute appointment, for the last 3 days he has had runny nose, cough with seems quite bothersome  He had a sore throat the 1st night but that seems to have improved  Barbara Delgado He denies any fever, no chest pain or shortness of breath or wheezing  He notes that he gets 1 of these episodes once a year and he generally prescribed a Z-Nestor  The following portions of the patient's history were reviewed and updated as appropriate: current medications, past medical history, past social history and past surgical history      PHQ-9 Depression Screening    PHQ-9:    Frequency of the following problems over the past two weeks:                Current Outpatient Medications:     clonazePAM (KlonoPIN) 1 mg tablet, Take 1 mg by mouth daily as needed, Disp: , Rfl: 0    nortriptyline (PAMELOR) 50 mg capsule, Take 2 capsules by mouth, Disp: , Rfl:     azithromycin (ZITHROMAX) 250 mg tablet, Take 2 today then 1 daily for 4 days, Disp: 6 tablet, Rfl: 0    benzonatate (TESSALON PERLES) 100 mg capsule, Take 1 capsule (100 mg total) by mouth 3 (three) times a day as needed for cough, Disp: 20 capsule, Rfl: 0    fluticasone (FLONASE) 50 mcg/act nasal spray, 1 spray into each nostril daily, Disp: 1 Bottle, Rfl: 1    Review of Systems   Constitutional: Negative for fatigue, fever and unexpected weight change  HENT: Positive for congestion and postnasal drip  Negative for ear pain, hearing loss and sore throat  Eyes: Negative for pain and discharge  Respiratory: Positive for cough  Negative for chest tightness and shortness of breath  Cardiovascular: Negative for chest pain and palpitations  Gastrointestinal: Negative for abdominal pain, blood in stool, constipation, diarrhea and nausea  Genitourinary: Negative for dysuria, frequency and hematuria  Musculoskeletal: Negative for arthralgias and joint swelling  Skin: Negative for rash  Allergic/Immunologic: Negative for immunocompromised state  Neurological: Negative for dizziness and headaches  Hematological: Negative for adenopathy  Psychiatric/Behavioral: Negative for confusion and sleep disturbance  Objective:  /88 (BP Location: Left arm, Patient Position: Sitting, Cuff Size: Standard)   Pulse 92   Temp 99 9 °F (37 7 °C)   Resp 16   Ht 5' 9" (1 753 m)   Wt 86 kg (189 lb 9 6 oz)   BMI 28 00 kg/m²      Physical Exam   Constitutional: He appears well-developed and well-nourished  HENT:   Head: Normocephalic and atraumatic  Right Ear: Tympanic membrane normal    Left Ear: Tympanic membrane normal    Nose: Nose normal    Mouth/Throat: Oropharynx is clear and moist  No posterior oropharyngeal edema or posterior oropharyngeal erythema  Eyes: Pupils are equal, round, and reactive to light  Conjunctivae are normal  Right eye exhibits no discharge  Left eye exhibits no discharge  Neck: Normal range of motion  Neck supple  No thyromegaly present     Cardiovascular: Normal rate, regular rhythm, S1 normal, S2 normal and normal heart sounds  PMI is not displaced  No murmur heard  Pulmonary/Chest: Effort normal and breath sounds normal  No accessory muscle usage  No apnea  No respiratory distress  He has no rhonchi  He has no rales  Abdominal: Soft  Normal appearance and bowel sounds are normal  He exhibits no shifting dullness  There is no hepatosplenomegaly  There is no tenderness  There is no rebound and no CVA tenderness  Musculoskeletal: Normal range of motion  He exhibits no edema or tenderness  Lymphadenopathy:     He has no cervical adenopathy  Neurological: He is alert  Skin: Skin is warm and intact  No rash noted  Psychiatric: He has a normal mood and affect  His speech is normal    Nursing note and vitals reviewed  No results found for this or any previous visit (from the past 1008 hour(s))  ]    No results found

## 2019-10-16 ENCOUNTER — TELEPHONE (OUTPATIENT)
Dept: UROLOGY | Facility: CLINIC | Age: 59
End: 2019-10-16

## 2019-10-16 LAB — PSA SERPL-MCNC: 0.1 NG/ML

## 2019-10-16 NOTE — TELEPHONE ENCOUNTER
----- Message from Eros Lance MD sent at 10/16/2019  8:37 AM EDT -----  Please inform patient that the results are normal

## 2020-04-02 ENCOUNTER — TELEPHONE (OUTPATIENT)
Dept: UROLOGY | Facility: MEDICAL CENTER | Age: 60
End: 2020-04-02

## 2020-05-07 ENCOUNTER — TELEPHONE (OUTPATIENT)
Dept: UROLOGY | Facility: MEDICAL CENTER | Age: 60
End: 2020-05-07

## 2020-08-03 ENCOUNTER — TELEPHONE (OUTPATIENT)
Dept: INTERNAL MEDICINE CLINIC | Facility: CLINIC | Age: 60
End: 2020-08-03

## 2020-08-03 DIAGNOSIS — I10 ESSENTIAL HYPERTENSION: Primary | ICD-10-CM

## 2020-08-03 DIAGNOSIS — F41.8 DEPRESSION WITH ANXIETY: ICD-10-CM

## 2020-08-03 NOTE — TELEPHONE ENCOUNTER
Patient has an appointment in September and would like routine labs done before that  He would like it mailed to his home

## 2020-08-04 ENCOUNTER — OFFICE VISIT (OUTPATIENT)
Dept: UROLOGY | Facility: MEDICAL CENTER | Age: 60
End: 2020-08-04
Payer: COMMERCIAL

## 2020-08-04 VITALS
TEMPERATURE: 97.5 F | HEART RATE: 69 BPM | DIASTOLIC BLOOD PRESSURE: 80 MMHG | SYSTOLIC BLOOD PRESSURE: 118 MMHG | HEIGHT: 69 IN | WEIGHT: 174 LBS | BODY MASS INDEX: 25.77 KG/M2

## 2020-08-04 DIAGNOSIS — Z85.46 HISTORY OF PROSTATE CANCER: Primary | ICD-10-CM

## 2020-08-04 LAB
SL AMB  POCT GLUCOSE, UA: NORMAL
SL AMB LEUKOCYTE ESTERASE,UA: NORMAL
SL AMB POCT BILIRUBIN,UA: NORMAL
SL AMB POCT BLOOD,UA: NORMAL
SL AMB POCT CLARITY,UA: CLEAR
SL AMB POCT COLOR,UA: YELLOW
SL AMB POCT KETONES,UA: NORMAL
SL AMB POCT NITRITE,UA: NORMAL
SL AMB POCT PH,UA: 5
SL AMB POCT SPECIFIC GRAVITY,UA: 1.02
SL AMB POCT URINE PROTEIN: NORMAL
SL AMB POCT UROBILINOGEN: 0.2

## 2020-08-04 PROCEDURE — 3008F BODY MASS INDEX DOCD: CPT | Performed by: UROLOGY

## 2020-08-04 PROCEDURE — 3074F SYST BP LT 130 MM HG: CPT | Performed by: UROLOGY

## 2020-08-04 PROCEDURE — 99213 OFFICE O/P EST LOW 20 MIN: CPT | Performed by: UROLOGY

## 2020-08-04 PROCEDURE — 1036F TOBACCO NON-USER: CPT | Performed by: UROLOGY

## 2020-08-04 PROCEDURE — 3079F DIAST BP 80-89 MM HG: CPT | Performed by: UROLOGY

## 2020-08-04 PROCEDURE — 81003 URINALYSIS AUTO W/O SCOPE: CPT | Performed by: UROLOGY

## 2020-08-04 NOTE — PROGRESS NOTES
[address] [facility]  [facility]  [address] [facility] [place]  [place], [address] [facility]  [phone]  www  [facility]  org      NAME: Krys Black  AGE: 62 y o  SEX: male  : 1960   MRN: [id number]    DATE: 2020  TIME: 9:23 AM    Assessment and Plan:    Prostate cancer 12 years status post radical retropubic prostatectomy, PSA remains stable at 0 1  he would like to have his PSA checked twice a year, and I will have him check this at 6 months and send him the results  I will see him at 1 year with another PSA  Chief Complaint   No chief complaint on file  History of Present Illness   Prostate cancer:  Status post radical retropubic prostatectomy 2008, pathology showed [place] 6 bilaterally involving 60% of gland, negative margins pT2c  PSAs have remained at 0 1 and this year shows 0 1 2020  Preop PSA was 9 9  Voiding okay, no major stress incontinence unless he has a couple beers  And then that is minor  Erectile dysfunction:  We discussed this last year and he was not interested in shots or IPP  urinalysis is negative    The following portions of the patient's history were reviewed and updated as appropriate: allergies, current medications, past family history, past medical history, past social history, past surgical history and problem list   Past Medical History:   Diagnosis Date    Adenocarcinoma of prostate ([facility] [address] )     last assessed 2015    Bladder neck contracture     Colon polyps     last assessed 2015    Erectile dysfunction following radical prostatectomy     Feeling of incomplete bladder emptying     Squamous cell carcinoma of skin     last assessed 2016     Past Surgical History:   Procedure Laterality Date    ARTHROSCOPY ANKLE  2010    BACK SURGERY      COLONOSCOPY      last assessed 2015    KNEE SURGERY Right     last assessed 2016    PROSTATECTOMY  2008    last assessed 05/12/2015     shoulder  Review of Systems   Review of Systems   Constitutional: Negative for fever  Respiratory: Negative  Cardiovascular: Negative  Genitourinary: Negative  Active Problem List     Patient Active Problem List   Diagnosis    Depression with anxiety    Erectile dysfunction following radical prostatectomy    Essential hypertension       Objective   Ht 5' 9"   BMI 28 00 kg/m²     Physical Exam   HENT:   Head: Normocephalic and atraumatic  Pulmonary/Chest: Effort normal    Abdominal: Normal appearance  Musculoskeletal: Normal range of motion  Neurological: He is alert     Psychiatric: His behavior is normal  Mood, judgment and thought content normal            Current Medications     Current Outpatient Medications:     benzonatate (TESSALON PERLES) 100 mg capsule, Take 1 capsule (100 mg total) by mouth 3 (three) times a day as needed for cough, Disp: 20 capsule, Rfl: 0    clonazePAM (KlonoPIN) 1 mg tablet, Take 1 mg by mouth daily as needed, Disp: , Rfl: 0    fluticasone (FLONASE) 50 mcg/act nasal spray, 1 spray into each nostril daily, Disp: 1 Bottle, Rfl: 1    nortriptyline (PAMELOR) 50 mg capsule, Take 2 capsules by mouth, Disp: , Rfl:         Tete Alonzo MD

## 2020-08-25 LAB
ALBUMIN SERPL-MCNC: 4.4 G/DL (ref 3.6–5.1)
ALBUMIN/GLOB SERPL: 2 (CALC) (ref 1–2.5)
ALP SERPL-CCNC: 89 U/L (ref 35–144)
ALT SERPL-CCNC: 17 U/L (ref 9–46)
AST SERPL-CCNC: 17 U/L (ref 10–35)
BASOPHILS # BLD AUTO: 38 CELLS/UL (ref 0–200)
BASOPHILS NFR BLD AUTO: 0.5 %
BILIRUB SERPL-MCNC: 0.9 MG/DL (ref 0.2–1.2)
BUN SERPL-MCNC: 19 MG/DL (ref 7–25)
BUN/CREAT SERPL: NORMAL (CALC) (ref 6–22)
CALCIUM SERPL-MCNC: 9.4 MG/DL (ref 8.6–10.3)
CHLORIDE SERPL-SCNC: 105 MMOL/L (ref 98–110)
CHOLEST SERPL-MCNC: 157 MG/DL
CHOLEST/HDLC SERPL: 2.4 (CALC)
CO2 SERPL-SCNC: 30 MMOL/L (ref 20–32)
CREAT SERPL-MCNC: 1.18 MG/DL (ref 0.7–1.25)
EOSINOPHIL # BLD AUTO: 99 CELLS/UL (ref 15–500)
EOSINOPHIL NFR BLD AUTO: 1.3 %
ERYTHROCYTE [DISTWIDTH] IN BLOOD BY AUTOMATED COUNT: 12.3 % (ref 11–15)
GLOBULIN SER CALC-MCNC: 2.2 G/DL (CALC) (ref 1.9–3.7)
GLUCOSE SERPL-MCNC: 92 MG/DL (ref 65–99)
HCT VFR BLD AUTO: 46.3 % (ref 38.5–50)
HDLC SERPL-MCNC: 65 MG/DL
HGB BLD-MCNC: 15.8 G/DL (ref 13.2–17.1)
LDLC SERPL CALC-MCNC: 80 MG/DL (CALC)
LYMPHOCYTES # BLD AUTO: 1702 CELLS/UL (ref 850–3900)
LYMPHOCYTES NFR BLD AUTO: 22.4 %
MCH RBC QN AUTO: 30 PG (ref 27–33)
MCHC RBC AUTO-ENTMCNC: 34.1 G/DL (ref 32–36)
MCV RBC AUTO: 88 FL (ref 80–100)
MONOCYTES # BLD AUTO: 471 CELLS/UL (ref 200–950)
MONOCYTES NFR BLD AUTO: 6.2 %
NEUTROPHILS # BLD AUTO: 5290 CELLS/UL (ref 1500–7800)
NEUTROPHILS NFR BLD AUTO: 69.6 %
NONHDLC SERPL-MCNC: 92 MG/DL (CALC)
PLATELET # BLD AUTO: 239 THOUSAND/UL (ref 140–400)
PMV BLD REES-ECKER: 9.3 FL (ref 7.5–12.5)
POTASSIUM SERPL-SCNC: 4.4 MMOL/L (ref 3.5–5.3)
PROT SERPL-MCNC: 6.6 G/DL (ref 6.1–8.1)
RBC # BLD AUTO: 5.26 MILLION/UL (ref 4.2–5.8)
SL AMB EGFR AFRICAN AMERICAN: 77 ML/MIN/1.73M2
SL AMB EGFR NON AFRICAN AMERICAN: 66 ML/MIN/1.73M2
SODIUM SERPL-SCNC: 141 MMOL/L (ref 135–146)
TRIGL SERPL-MCNC: 42 MG/DL
TSH SERPL-ACNC: 1.19 MIU/L (ref 0.4–4.5)
WBC # BLD AUTO: 7.6 THOUSAND/UL (ref 3.8–10.8)

## 2020-09-16 ENCOUNTER — OFFICE VISIT (OUTPATIENT)
Dept: INTERNAL MEDICINE CLINIC | Facility: CLINIC | Age: 60
End: 2020-09-16
Payer: COMMERCIAL

## 2020-09-16 VITALS
TEMPERATURE: 97 F | SYSTOLIC BLOOD PRESSURE: 122 MMHG | HEIGHT: 69 IN | HEART RATE: 91 BPM | WEIGHT: 170.2 LBS | RESPIRATION RATE: 14 BRPM | DIASTOLIC BLOOD PRESSURE: 84 MMHG | BODY MASS INDEX: 25.21 KG/M2

## 2020-09-16 DIAGNOSIS — Z23 ENCOUNTER FOR IMMUNIZATION: ICD-10-CM

## 2020-09-16 DIAGNOSIS — Z00.00 PHYSICAL EXAM, ANNUAL: Primary | ICD-10-CM

## 2020-09-16 PROCEDURE — 3079F DIAST BP 80-89 MM HG: CPT | Performed by: INTERNAL MEDICINE

## 2020-09-16 PROCEDURE — 1036F TOBACCO NON-USER: CPT | Performed by: INTERNAL MEDICINE

## 2020-09-16 PROCEDURE — 90471 IMMUNIZATION ADMIN: CPT | Performed by: INTERNAL MEDICINE

## 2020-09-16 PROCEDURE — 99396 PREV VISIT EST AGE 40-64: CPT | Performed by: INTERNAL MEDICINE

## 2020-09-16 PROCEDURE — 90682 RIV4 VACC RECOMBINANT DNA IM: CPT | Performed by: INTERNAL MEDICINE

## 2020-09-16 NOTE — PROGRESS NOTES
Assessment/Plan     Healthy male exam      1  BP improved on recheck  We discussed his blood work results  Normal cholesterol panel  He shows me a cardiology appointment that he had back 8 years ago for an abnormal stress EKG, this was done as part of routine testing by work  He then underwent nuclear stress test which was normal and he was told that stress EKG was likely a false positive  We discussed that routine stress tests are not generally recommended and did not alter the course of illness and reduce mortality and morbidity  We discussed that having a low threshold for testing if he does develop any symptoms would be reasonable  Continued screening blood work would be also reasonable  He walks 5 miles 5 days a week with no chest pain or shortness of breath  We discussed about anginal symptoms  2  Patient Counseling:  --Nutrition: Stressed importance of moderation in sodium/caffeine intake, saturated fat and cholesterol, caloric balance, sufficient intake of fresh fruits, vegetables, fiber, calcium, iron, and 1 mg of folate supplement per day (for females capable of pregnancy)  --Exercise: Stressed the importance of regular exercise  --Immunizations reviewed and updated  uptodate on influenza, shingrix, pneumovax at 65  --Metabolic screening was reviewed and updated  --Cancer screening was reviewed and updated, colonoscopy last year, repeat in 5, uptodate on psa    3  Discussed the patient's BMI with him  The BMI is in the acceptable range  4  Follow up in one year  Subjective     Chelsey Velazquez is a 62 y o  male and is here for a comprehensive physical exam  The patient reports no problems  The following portions of the patient's history were reviewed and updated as appropriate: current medications, past medical history, past social history and past surgical history  Review of Systems  Review of Systems   Constitutional: Negative for fatigue, fever and unexpected weight change     HENT: Negative for ear pain, hearing loss and sore throat  Eyes: Negative for pain and discharge  Respiratory: Negative for cough, chest tightness and shortness of breath  Cardiovascular: Negative for chest pain and palpitations  Gastrointestinal: Negative for abdominal pain, blood in stool, constipation, diarrhea and nausea  Genitourinary: Negative for dysuria, frequency and hematuria  Musculoskeletal: Negative for arthralgias and joint swelling  Skin: Negative for rash  Allergic/Immunologic: Negative for immunocompromised state  Neurological: Negative for dizziness and headaches  Hematological: Negative for adenopathy  Psychiatric/Behavioral: Negative for confusion and sleep disturbance  /84   Pulse 91   Temp (!) 97 °F (36 1 °C)   Resp 14   Ht 5' 9" (1 753 m)   Wt 77 2 kg (170 lb 3 2 oz)   BMI 25 13 kg/m²      Physical Exam  Vitals signs and nursing note reviewed  Constitutional:       Appearance: Normal appearance  He is well-developed  HENT:      Head: Normocephalic and atraumatic  Right Ear: Tympanic membrane normal       Left Ear: Tympanic membrane normal       Nose: Nose normal    Eyes:      General:         Right eye: No discharge  Left eye: No discharge  Conjunctiva/sclera: Conjunctivae normal       Pupils: Pupils are equal, round, and reactive to light  Neck:      Musculoskeletal: Normal range of motion and neck supple  Thyroid: No thyromegaly  Cardiovascular:      Rate and Rhythm: Normal rate and regular rhythm  Chest Wall: PMI is not displaced  Heart sounds: Normal heart sounds, S1 normal and S2 normal  No murmur  Pulmonary:      Effort: Pulmonary effort is normal  No accessory muscle usage or respiratory distress  Breath sounds: Normal breath sounds  No rhonchi or rales  Abdominal:      General: Bowel sounds are normal       Palpations: Abdomen is soft  There is no shifting dullness  Tenderness:  There is no abdominal tenderness  There is no rebound  Musculoskeletal: Normal range of motion  General: No tenderness  Lymphadenopathy:      Cervical: No cervical adenopathy  Skin:     General: Skin is warm  Findings: No rash  Neurological:      Mental Status: He is alert     Psychiatric:         Speech: Speech normal

## 2020-11-23 ENCOUNTER — TELEMEDICINE (OUTPATIENT)
Dept: INTERNAL MEDICINE CLINIC | Facility: CLINIC | Age: 60
End: 2020-11-23
Payer: COMMERCIAL

## 2020-11-23 DIAGNOSIS — F41.8 DEPRESSION WITH ANXIETY: ICD-10-CM

## 2020-11-23 DIAGNOSIS — M25.50 POLYARTHRALGIA: Primary | ICD-10-CM

## 2020-11-23 PROCEDURE — 1036F TOBACCO NON-USER: CPT | Performed by: INTERNAL MEDICINE

## 2020-11-23 PROCEDURE — 99213 OFFICE O/P EST LOW 20 MIN: CPT | Performed by: INTERNAL MEDICINE

## 2020-11-23 RX ORDER — NAPROXEN 500 MG/1
500 TABLET ORAL 2 TIMES DAILY WITH MEALS
Qty: 60 TABLET | Refills: 5 | Status: SHIPPED | OUTPATIENT
Start: 2020-11-23 | End: 2021-09-21

## 2020-11-24 LAB
ALBUMIN SERPL-MCNC: 4.1 G/DL (ref 3.6–5.1)
ALBUMIN/GLOB SERPL: 2 (CALC) (ref 1–2.5)
ALP SERPL-CCNC: 84 U/L (ref 35–144)
ALT SERPL-CCNC: 16 U/L (ref 9–46)
AST SERPL-CCNC: 16 U/L (ref 10–35)
BASOPHILS # BLD AUTO: 44 CELLS/UL (ref 0–200)
BASOPHILS NFR BLD AUTO: 0.5 %
BILIRUB SERPL-MCNC: 0.8 MG/DL (ref 0.2–1.2)
BUN SERPL-MCNC: 20 MG/DL (ref 7–25)
BUN/CREAT SERPL: 15 (CALC) (ref 6–22)
CALCIUM SERPL-MCNC: 9 MG/DL (ref 8.6–10.3)
CCP IGG SERPL-ACNC: <16 UNITS
CHLORIDE SERPL-SCNC: 105 MMOL/L (ref 98–110)
CO2 SERPL-SCNC: 28 MMOL/L (ref 20–32)
CREAT SERPL-MCNC: 1.33 MG/DL (ref 0.7–1.25)
CRP SERPL-MCNC: 14.5 MG/L
EOSINOPHIL # BLD AUTO: 78 CELLS/UL (ref 15–500)
EOSINOPHIL NFR BLD AUTO: 0.9 %
ERYTHROCYTE [DISTWIDTH] IN BLOOD BY AUTOMATED COUNT: 11.8 % (ref 11–15)
ERYTHROCYTE [SEDIMENTATION RATE] IN BLOOD BY WESTERGREN METHOD: 2 MM/H
GLOBULIN SER CALC-MCNC: 2.1 G/DL (CALC) (ref 1.9–3.7)
GLUCOSE SERPL-MCNC: 90 MG/DL (ref 65–139)
HCT VFR BLD AUTO: 45.6 % (ref 38.5–50)
HGB BLD-MCNC: 15.6 G/DL (ref 13.2–17.1)
LYMPHOCYTES # BLD AUTO: 2001 CELLS/UL (ref 850–3900)
LYMPHOCYTES NFR BLD AUTO: 23 %
MCH RBC QN AUTO: 29.8 PG (ref 27–33)
MCHC RBC AUTO-ENTMCNC: 34.2 G/DL (ref 32–36)
MCV RBC AUTO: 87.2 FL (ref 80–100)
MONOCYTES # BLD AUTO: 522 CELLS/UL (ref 200–950)
MONOCYTES NFR BLD AUTO: 6 %
NEUTROPHILS # BLD AUTO: 6055 CELLS/UL (ref 1500–7800)
NEUTROPHILS NFR BLD AUTO: 69.6 %
PLATELET # BLD AUTO: 264 THOUSAND/UL (ref 140–400)
PMV BLD REES-ECKER: 9.9 FL (ref 7.5–12.5)
POTASSIUM SERPL-SCNC: 4.5 MMOL/L (ref 3.5–5.3)
PROT SERPL-MCNC: 6.2 G/DL (ref 6.1–8.1)
RBC # BLD AUTO: 5.23 MILLION/UL (ref 4.2–5.8)
RHEUMATOID FACT SERPL-ACNC: <14 IU/ML
SL AMB EGFR AFRICAN AMERICAN: 66 ML/MIN/1.73M2
SL AMB EGFR NON AFRICAN AMERICAN: 57 ML/MIN/1.73M2
SODIUM SERPL-SCNC: 143 MMOL/L (ref 135–146)
WBC # BLD AUTO: 8.7 THOUSAND/UL (ref 3.8–10.8)

## 2020-12-01 ENCOUNTER — TELEPHONE (OUTPATIENT)
Dept: INTERNAL MEDICINE CLINIC | Facility: CLINIC | Age: 60
End: 2020-12-01

## 2020-12-01 DIAGNOSIS — I10 ESSENTIAL HYPERTENSION: Primary | ICD-10-CM

## 2020-12-29 LAB
BUN SERPL-MCNC: 23 MG/DL (ref 7–25)
BUN/CREAT SERPL: NORMAL (CALC) (ref 6–22)
CALCIUM SERPL-MCNC: 9 MG/DL (ref 8.6–10.3)
CHLORIDE SERPL-SCNC: 105 MMOL/L (ref 98–110)
CO2 SERPL-SCNC: 28 MMOL/L (ref 20–32)
CREAT SERPL-MCNC: 0.98 MG/DL (ref 0.7–1.25)
GLUCOSE SERPL-MCNC: 89 MG/DL (ref 65–99)
POTASSIUM SERPL-SCNC: 4.4 MMOL/L (ref 3.5–5.3)
SL AMB EGFR AFRICAN AMERICAN: 95 ML/MIN/1.73M2
SL AMB EGFR NON AFRICAN AMERICAN: 82 ML/MIN/1.73M2
SODIUM SERPL-SCNC: 141 MMOL/L (ref 135–146)

## 2020-12-30 ENCOUNTER — TELEPHONE (OUTPATIENT)
Dept: INTERNAL MEDICINE CLINIC | Facility: CLINIC | Age: 60
End: 2020-12-30

## 2021-02-08 ENCOUNTER — TELEPHONE (OUTPATIENT)
Dept: UROLOGY | Facility: MEDICAL CENTER | Age: 61
End: 2021-02-08

## 2021-02-08 NOTE — TELEPHONE ENCOUNTER
----- Message from Jaylen Elam MD sent at 2/8/2021 11:46 AM EST -----  Please let him know that his PSA is stable at 0 1

## 2021-04-19 ENCOUNTER — IMMUNIZATIONS (OUTPATIENT)
Dept: FAMILY MEDICINE CLINIC | Facility: HOSPITAL | Age: 61
End: 2021-04-19

## 2021-04-19 DIAGNOSIS — Z23 ENCOUNTER FOR IMMUNIZATION: Primary | ICD-10-CM

## 2021-04-19 PROCEDURE — 91301 SARS-COV-2 / COVID-19 MRNA VACCINE (MODERNA) 100 MCG: CPT

## 2021-04-19 PROCEDURE — 0011A SARS-COV-2 / COVID-19 MRNA VACCINE (MODERNA) 100 MCG: CPT

## 2021-05-20 ENCOUNTER — IMMUNIZATIONS (OUTPATIENT)
Dept: FAMILY MEDICINE CLINIC | Facility: HOSPITAL | Age: 61
End: 2021-05-20

## 2021-05-20 DIAGNOSIS — Z23 ENCOUNTER FOR IMMUNIZATION: Primary | ICD-10-CM

## 2021-05-20 PROCEDURE — 91301 SARS-COV-2 / COVID-19 MRNA VACCINE (MODERNA) 100 MCG: CPT

## 2021-05-20 PROCEDURE — 0012A SARS-COV-2 / COVID-19 MRNA VACCINE (MODERNA) 100 MCG: CPT

## 2021-07-01 ENCOUNTER — TELEPHONE (OUTPATIENT)
Dept: OTHER | Facility: OTHER | Age: 61
End: 2021-07-01

## 2021-09-08 ENCOUNTER — TELEPHONE (OUTPATIENT)
Dept: INTERNAL MEDICINE CLINIC | Facility: CLINIC | Age: 61
End: 2021-09-08

## 2021-09-08 DIAGNOSIS — I10 ESSENTIAL HYPERTENSION: Primary | ICD-10-CM

## 2021-09-08 DIAGNOSIS — Z12.5 SCREENING FOR PROSTATE CANCER: ICD-10-CM

## 2021-09-08 DIAGNOSIS — R73.09 ABNORMAL GLUCOSE LEVEL: ICD-10-CM

## 2021-09-08 NOTE — TELEPHONE ENCOUNTER
Pt left a message asking if you can order bloodwork for his upcoming appoint 9/21/21  I will call pt and let him know after order was placed

## 2021-09-10 ENCOUNTER — OFFICE VISIT (OUTPATIENT)
Dept: UROLOGY | Facility: MEDICAL CENTER | Age: 61
End: 2021-09-10
Payer: COMMERCIAL

## 2021-09-10 VITALS
WEIGHT: 180 LBS | HEART RATE: 76 BPM | DIASTOLIC BLOOD PRESSURE: 76 MMHG | SYSTOLIC BLOOD PRESSURE: 120 MMHG | BODY MASS INDEX: 26.66 KG/M2 | HEIGHT: 69 IN

## 2021-09-10 DIAGNOSIS — Z85.46 HISTORY OF PROSTATE CANCER: Primary | ICD-10-CM

## 2021-09-10 PROCEDURE — 99213 OFFICE O/P EST LOW 20 MIN: CPT | Performed by: PHYSICIAN ASSISTANT

## 2021-09-10 NOTE — PROGRESS NOTES
9/10/2021      Chief Complaint   Patient presents with    Prostate Cancer         Assessment and Plan    56 y o  male managed by Dr Lizbeth Badillo    1  Prostate cancer   · Nya 6 prostate cancer s/p RALP in 2008  · PSA: 0 1 (8/5/21)  · Previous PSAs: 0 1 (2/3/21), 0 1 (5/15/2020), 0 1 (10/15/19)   · PSA in 6 months, follow up in 1 year  2  ED secondary to prostate  · Refractory to PDE 5 inhibitors and injections  · Reviewed penile implant, patient defers at this time  History of Present Illness  Merrill Bruno is a 56 y o  male here for evaluation of prostate cancer  Patient is s/p Rexene Lunch in 2008 for Nya 6 prostate cancer  He had negative margins and his PSA has remained stable at 0 1  His PSA was as high as 9 9 prior to his procedure  He presents today doing well  Denies any dysuria, frequency, and only experiences urgency occasionally  He occasionally uses urge urinary incontinence as well exacerbated by bladder irritants such as beer  He does note intermittent right lower quadrant discomfort which he plans to follow-up with his PCP  He denies any fevers or chills  Denies any episodes of gross hematuria  He does come today with news paper clip things regarding possible erectile dysfunction treatment including acoustic wave therapy and new nitric oxide medication  Review of Systems   Constitutional: Negative for chills and fever  HENT: Negative  Respiratory: Negative  Cardiovascular: Negative  Gastrointestinal: Negative for abdominal pain (Intermittent RLQ discomfort), nausea and vomiting  Genitourinary: Positive for urgency (Occasionally, also occasionally experiences urge urinary incontinence)  Negative for difficulty urinating, dysuria, flank pain, frequency, hematuria, scrotal swelling and testicular pain  Musculoskeletal: Negative  Skin: Negative  Neurological: Negative        Vitals  Vitals:    09/10/21 1314   BP: 120/76   Pulse: 76   Weight: 81 6 kg (180 lb) Height: 5' 9" (1 753 m)       Physical Exam  Vitals reviewed  Constitutional:       General: He is not in acute distress  Appearance: Normal appearance  He is not ill-appearing, toxic-appearing or diaphoretic  HENT:      Head: Normocephalic and atraumatic  Eyes:      Conjunctiva/sclera: Conjunctivae normal    Pulmonary:      Effort: Pulmonary effort is normal  No respiratory distress  Abdominal:      General: There is no distension  Palpations: Abdomen is soft  Tenderness: There is no abdominal tenderness  There is no right CVA tenderness or guarding  Musculoskeletal:         General: Normal range of motion  Cervical back: Normal range of motion  Skin:     General: Skin is warm and dry  Neurological:      General: No focal deficit present  Mental Status: He is alert and oriented to person, place, and time  Psychiatric:         Mood and Affect: Mood normal          Behavior: Behavior normal          Thought Content: Thought content normal          Judgment: Judgment normal        Past History  Past Medical History:   Diagnosis Date    Adenocarcinoma of prostate ([facility] [address] )     last assessed 05/12/2015    Bladder neck contracture     Colon polyps     last assessed 05/12/2015    Erectile dysfunction following radical prostatectomy     Feeling of incomplete bladder emptying     Squamous cell carcinoma of skin     last assessed 12/30/2016     Social History     Socioeconomic History    Marital status: /Civil Union     Spouse name: None    Number of children: None    Years of education: None    Highest education level: None   Occupational History    None   Tobacco Use    Smoking status: Former Smoker    Smokeless tobacco: Never Used   Substance and Sexual Activity    Alcohol use:  Yes    Drug use: No    Sexual activity: None   Other Topics Concern    None   Social History Narrative    None     Social Determinants of Health     Financial Resource Strain:     Difficulty of Paying Living Expenses:    Food Insecurity:     Worried About Running Out of Food in the Last Year:     [address] in the Last Year:    Transportation Needs:     Lack of Transportation (Medical):      Lack of Transportation (Non-Medical):    Physical Activity:     Days of Exercise per Week:     Minutes of Exercise per Session:    Stress:     Feeling of Stress :    Social Connections:     Frequency of Communication with Friends and Family:     Frequency of Social Gatherings with Friends and Family:     Attends [employer] Services:     Active Member of Clubs or Organizations:     Attends Club or Organization Meetings:     Marital Status:    Intimate Partner Violence:     Fear of Current or Ex-Partner:     Emotionally Abused:     Physically Abused:     Sexually Abused:      Social History     Tobacco Use   Smoking Status Former Smoker   Smokeless Tobacco Never Used     Family History   Problem Relation Age of Onset    Coronary artery disease Mother     Coronary artery disease Father     Melanoma Father        The following portions of the patient's history were reviewed and updated as appropriate: allergies, current medications, past medical history, past social history, past surgical history and problem list     Results  No results found for this or any previous visit (from the past 1 hour(s)) ]  Lab Results   Component Value Date    PSA 0 1 10/15/2019    PSA 0 1 04/01/2019    PSA 0 2 01/03/2019    PSA 0 1 08/14/2018     Lab Results   Component Value Date    GLUCOSE 93 06/23/2014    CALCIUM 9 0 12/28/2020     05/22/2017    K 4 4 12/28/2020    CO2 28 12/28/2020     12/28/2020    BUN 23 12/28/2020    CREATININE 0 98 12/28/2020     Lab Results   Component Value Date    WBC 8 7 11/23/2020    HGB 15 6 11/23/2020    HCT 45 6 11/23/2020    MCV 87 2 11/23/2020     11/23/2020     Leslie Fong PA-C

## 2021-09-16 LAB
ALBUMIN SERPL-MCNC: 4.3 G/DL (ref 3.6–5.1)
ALBUMIN/GLOB SERPL: 2.2 (CALC) (ref 1–2.5)
ALP SERPL-CCNC: 76 U/L (ref 35–144)
ALT SERPL-CCNC: 16 U/L (ref 9–46)
APPEARANCE UR: ABNORMAL
AST SERPL-CCNC: 18 U/L (ref 10–35)
BASOPHILS # BLD AUTO: 39 CELLS/UL (ref 0–200)
BASOPHILS NFR BLD AUTO: 0.5 %
BILIRUB SERPL-MCNC: 0.9 MG/DL (ref 0.2–1.2)
BILIRUB UR QL STRIP: NEGATIVE
BUN SERPL-MCNC: 25 MG/DL (ref 7–25)
BUN/CREAT SERPL: NORMAL (CALC) (ref 6–22)
CALCIUM SERPL-MCNC: 8.9 MG/DL (ref 8.6–10.3)
CHLORIDE SERPL-SCNC: 106 MMOL/L (ref 98–110)
CHOLEST SERPL-MCNC: 166 MG/DL
CHOLEST/HDLC SERPL: 2.8 (CALC)
CO2 SERPL-SCNC: 27 MMOL/L (ref 20–32)
COLOR UR: YELLOW
CREAT SERPL-MCNC: 1.24 MG/DL (ref 0.7–1.25)
EOSINOPHIL # BLD AUTO: 78 CELLS/UL (ref 15–500)
EOSINOPHIL NFR BLD AUTO: 1 %
ERYTHROCYTE [DISTWIDTH] IN BLOOD BY AUTOMATED COUNT: 12.5 % (ref 11–15)
GLOBULIN SER CALC-MCNC: 2 G/DL (CALC) (ref 1.9–3.7)
GLUCOSE SERPL-MCNC: 90 MG/DL (ref 65–99)
GLUCOSE UR QL STRIP: NEGATIVE
HBA1C MFR BLD: 4.9 % OF TOTAL HGB
HCT VFR BLD AUTO: 45.9 % (ref 38.5–50)
HDLC SERPL-MCNC: 60 MG/DL
HGB BLD-MCNC: 15.7 G/DL (ref 13.2–17.1)
HGB UR QL STRIP: NEGATIVE
KETONES UR QL STRIP: NEGATIVE
LDLC SERPL CALC-MCNC: 93 MG/DL (CALC)
LEUKOCYTE ESTERASE UR QL STRIP: NEGATIVE
LYMPHOCYTES # BLD AUTO: 2145 CELLS/UL (ref 850–3900)
LYMPHOCYTES NFR BLD AUTO: 27.5 %
MCH RBC QN AUTO: 29.8 PG (ref 27–33)
MCHC RBC AUTO-ENTMCNC: 34.2 G/DL (ref 32–36)
MCV RBC AUTO: 87.3 FL (ref 80–100)
MONOCYTES # BLD AUTO: 562 CELLS/UL (ref 200–950)
MONOCYTES NFR BLD AUTO: 7.2 %
NEUTROPHILS # BLD AUTO: 4976 CELLS/UL (ref 1500–7800)
NEUTROPHILS NFR BLD AUTO: 63.8 %
NITRITE UR QL STRIP: NEGATIVE
NONHDLC SERPL-MCNC: 106 MG/DL (CALC)
PH UR STRIP: ABNORMAL [PH] (ref 5–8)
PLATELET # BLD AUTO: 243 THOUSAND/UL (ref 140–400)
PMV BLD REES-ECKER: 9.5 FL (ref 7.5–12.5)
POTASSIUM SERPL-SCNC: 4.3 MMOL/L (ref 3.5–5.3)
PROT SERPL-MCNC: 6.3 G/DL (ref 6.1–8.1)
PROT UR QL STRIP: NEGATIVE
PSA SERPL-MCNC: 0.13 NG/ML
RBC # BLD AUTO: 5.26 MILLION/UL (ref 4.2–5.8)
SL AMB EGFR AFRICAN AMERICAN: 72 ML/MIN/1.73M2
SL AMB EGFR NON AFRICAN AMERICAN: 62 ML/MIN/1.73M2
SODIUM SERPL-SCNC: 141 MMOL/L (ref 135–146)
SP GR UR STRIP: 1.02 (ref 1–1.03)
TRIGL SERPL-MCNC: 45 MG/DL
WBC # BLD AUTO: 7.8 THOUSAND/UL (ref 3.8–10.8)

## 2021-09-21 ENCOUNTER — OFFICE VISIT (OUTPATIENT)
Dept: INTERNAL MEDICINE CLINIC | Facility: CLINIC | Age: 61
End: 2021-09-21
Payer: COMMERCIAL

## 2021-09-21 VITALS
HEART RATE: 80 BPM | OXYGEN SATURATION: 97 % | TEMPERATURE: 97.3 F | RESPIRATION RATE: 16 BRPM | WEIGHT: 176 LBS | DIASTOLIC BLOOD PRESSURE: 72 MMHG | HEIGHT: 69 IN | SYSTOLIC BLOOD PRESSURE: 112 MMHG | BODY MASS INDEX: 26.07 KG/M2

## 2021-09-21 DIAGNOSIS — Z23 NEED FOR INFLUENZA VACCINATION: ICD-10-CM

## 2021-09-21 DIAGNOSIS — Z00.00 WELL ADULT EXAM: Primary | ICD-10-CM

## 2021-09-21 DIAGNOSIS — E66.3 OVERWEIGHT (BMI 25.0-29.9): ICD-10-CM

## 2021-09-21 PROBLEM — R73.09 ABNORMAL GLUCOSE LEVEL: Status: RESOLVED | Noted: 2021-09-08 | Resolved: 2021-09-21

## 2021-09-21 PROBLEM — Z85.46 HISTORY OF PROSTATE CANCER: Status: ACTIVE | Noted: 2021-09-21

## 2021-09-21 PROCEDURE — 99396 PREV VISIT EST AGE 40-64: CPT | Performed by: INTERNAL MEDICINE

## 2021-09-21 PROCEDURE — 1036F TOBACCO NON-USER: CPT | Performed by: INTERNAL MEDICINE

## 2021-09-21 PROCEDURE — 3008F BODY MASS INDEX DOCD: CPT | Performed by: INTERNAL MEDICINE

## 2021-09-21 PROCEDURE — 90471 IMMUNIZATION ADMIN: CPT | Performed by: INTERNAL MEDICINE

## 2021-09-21 PROCEDURE — 90682 RIV4 VACC RECOMBINANT DNA IM: CPT | Performed by: INTERNAL MEDICINE

## 2021-09-21 NOTE — PROGRESS NOTES
Assessment/Plan:    1  Well adult exam     2  Essential hypertension     3  Overweight (BMI 25 0-29 9)     4  Need for influenza vaccination  influenza vaccine, quadrivalent, recombinant, PF, 0 5 mL, for patients 18 yr+ (FLUBLOK)        A&P Notes: Follow up is in one year    Subjective:      Patient ID: Drake Bowman is a 56 y o  male who is here today to his reestablish care and for annual wellness visit  Time is retired and is enjoying his jail  His lifestyle is much improved since jail in the last year, with 25 lb weight loss, more regular exercise and better diet  He is feeling very well  He has history of open medial meniscal repair of the right knee in 1976 in [address] and is still followed by Orthopedics  He has good function minimal pain including with high impact activities such as pickle ball  He will be undergoing lubricant injections in the near future  With weight reduction, hypertension has resolved completely  He is on no medicines with an ideal blood pressure  Preventive cares up-to-date and was reviewed in detail  This includes follow-up with Urology with history of prostate cancer with stable screening PSA measurements  He has no urinary difficulties status post TURP  We reviewed his September 15th labs which are optimal       He is not reporting any new problems  BMI Counseling: Body mass index is 25 99 kg/m²  The BMI is above normal  Nutrition recommendations include reducing portion sizes and decreasing overall calorie intake  Exercise recommendations include vigorous aerobic physical activity for 75 minutes/week      Past Medical History:   Diagnosis Date    Adenocarcinoma of prostate ([facility] [address] )     last assessed 05/12/2015    Bladder neck contracture     Colon polyps     last assessed 05/12/2015    Erectile dysfunction following radical prostatectomy     Feeling of incomplete bladder emptying     Squamous cell carcinoma of skin     last assessed 12/30/2016        Family History   Problem Relation Age of Onset    Coronary artery disease Mother     Coronary artery disease Father     Melanoma Father         Social History     Socioeconomic History    Marital status: /Civil Union     Spouse name: Not on file    Number of children: Not on file    Years of education: Not on file    Highest education level: Not on file   Occupational History    Not on file   Tobacco Use    Smoking status: Former Smoker    Smokeless tobacco: Never Used   Substance and Sexual Activity    Alcohol use: Yes    Drug use: No    Sexual activity: Not on file   Other Topics Concern    Not on file   Social History Narrative    Not on file     Social Determinants of Health     Financial Resource Strain:     Difficulty of Paying Living Expenses:    Food Insecurity:     Worried About Running Out of Food in the Last Year:     [address] in the Last Year:    Transportation Needs:     Lack of Transportation (Medical):  Lack of Transportation (Non-Medical):    Physical Activity:     Days of Exercise per Week:     Minutes of Exercise per Session:    Stress:     Feeling of Stress :    Social Connections:     Frequency of Communication with Friends and Family:     Frequency of Social Gatherings with Friends and Family:     Attends Yazdan[employer] Services:     Active Member of Clubs or Organizations:     Attends Club or Organization Meetings:     Marital Status:    Intimate Partner Violence:     Fear of Current or Ex-Partner:     Emotionally Abused:     Physically Abused:     Sexually Abused:         No Known Allergies     Current Outpatient Medications   Medication Sig Dispense Refill    clonazePAM (KlonoPIN) 1 mg tablet Take 1 mg by mouth daily as needed  0     No current facility-administered medications for this visit            Review of Systems     Objective:      /72   Pulse 80   Temp (!) 97 3 °F (36 3 °C)   Resp 16   Ht 5' 9" (1 753 m)   Wt 79 8 kg (176 lb)   SpO2 97%   BMI 25 99 kg/m²      Wt Readings from Last 3 Encounters:   09/21/21 79 8 kg (176 lb)   09/10/21 81 6 kg (180 lb)   09/16/20 77 2 kg (170 lb 3 2 oz)     Temp Readings from Last 3 Encounters:   09/21/21 (!) 97 3 °F (36 3 °C)   09/16/20 (!) 97 °F (36 1 °C)   08/04/20 97 5 °F (36 4 °C)     BP Readings from Last 3 Encounters:   09/21/21 112/72   09/10/21 120/76   09/16/20 122/84     Pulse Readings from Last 3 Encounters:   09/21/21 80   09/10/21 76   09/16/20 91       Physical Exam     VSS, afebrile, pulse regular    Alert and Oriented in NAD    HEENT: NCAT, Pupils equal, 3 mm, EOEMI, no icterus or pallor, no iritis or conjunctivitis  No head or neck mass or adenopathy  Ears:  normal-appearing external auditory canals and tympanic membranes,       Neck: supple, no trauma or tenderness  No JVD  Normal carotid upstroke and descent without bruits  Trachea midline without stridor  Thyroid exam normal on inspection and palpation  No spinal tenderness, full range of motion  Lymphatics: no adenopathy throughout    Chest:  No trauma or deformity, no supraclavicular adenopathy or bruit  Chest wall expansion is symmetric and normal, expiratory phase is not prolonged  Heart:  Regular rate and rhythm, normal Q4-T0, no I6-T7, no clicks murmurs or rubs  Lungs:  Clear to auscultation and normal to percussion  Back:  No trauma or deformity, no CVA mass or CVA tenderness  Skin:  No rash or skin malignancy  Abdomen:  Nondistended, normal bowel sounds, soft nontender without masses bruits organomegaly  There is no hernia  There are no surgical scars  Extremities:  Arterial circulation is symmetrically normal with no clubbing cyanosis or edema  There are no varicosities, there is no calf tenderness or phlebitic findings  Joints: right knee- no instability, normal gait without pain, well healed nikki medial scar with medial joint space narrowing   No effusion, non-tender    Affect:  Normal    Neurologic:  Normal and stable gait, and otherwise no focal findings as well  Cognitive: Intact          I have reviewed pertinent labs:  CBC:   Lab Results   Component Value Date    WBC 7 8 09/15/2021    RBC 5 26 09/15/2021    HGB 15 7 09/15/2021    HCT 45 9 09/15/2021    MCV 87 3 09/15/2021     09/15/2021    MCH 29 8 09/15/2021    MCHC 34 2 09/15/2021    RDW 12 5 09/15/2021    MPV 8 1 05/22/2017    NEUTROABS 4,976 09/15/2021     CMP:   Lab Results   Component Value Date    SODIUM 141 09/15/2021    K 4 3 09/15/2021     09/15/2021    CO2 27 09/15/2021    BUN 25 09/15/2021    CREATININE 1 24 09/15/2021    GLUC 90 09/15/2021    CALCIUM 8 9 09/15/2021    AST 18 09/15/2021    ALT 16 09/15/2021    ALKPHOS 76 09/15/2021    TP 6 3 09/15/2021    ALB 4 3 09/15/2021    TBILI 0 9 09/15/2021     Lipid Profile:   Lab Results   Component Value Date    CHOLESTEROL 166 09/15/2021    HDL 60 09/15/2021    TRIG 45 09/15/2021    LDLCALC 93 09/15/2021    NONHDLC 98 05/22/2017     Hemoglobin A1C/EST AVG Glucose   Lab Results   Component Value Date    HGBA1C 4 9 09/15/2021     Urinalysis   Lab Results   Component Value Date    COLORU YELLOW 09/15/2021    CLARITYU clear 08/04/2020    SPECGRAV 1 023 09/15/2021    PHUR 5 0 12/28/2017    LEUKOCYTESUR NEGATIVE 09/15/2021    NITRITE NEGATIVE 09/15/2021    PROTEIN UA NEGATIVE 09/15/2021    GLUCOSEU NEGATIVE 09/15/2021    KETONESU NEGATIVE 09/15/2021    UROBILINOGEN 0 2 08/04/2020    BILIRUBINUR NEGATIVE 09/15/2021    BLOODU NEGATIVE 09/15/2021    RBCUA NONE SEEN 08/14/2018    WBCUA NONE SEEN 08/14/2018    BACTERIA NONE SEEN 08/14/2018

## 2022-03-14 ENCOUNTER — TELEPHONE (OUTPATIENT)
Dept: UROLOGY | Facility: MEDICAL CENTER | Age: 62
End: 2022-03-14

## 2022-03-14 NOTE — TELEPHONE ENCOUNTER
Call taken from voice mail  Requesting patient records for patient  Advised representative from [facility] that we would need a records request signed by patient  Advised by representative they did send a request to [address] [place] [address] and thanks me for the information and declined getting fax number at this time

## 2022-09-01 ENCOUNTER — TELEPHONE (OUTPATIENT)
Dept: INTERNAL MEDICINE CLINIC | Facility: CLINIC | Age: 62
End: 2022-09-01

## 2022-09-01 DIAGNOSIS — Z85.46 HISTORY OF PROSTATE CANCER: ICD-10-CM

## 2022-09-01 DIAGNOSIS — I10 ESSENTIAL HYPERTENSION: Primary | ICD-10-CM

## 2022-09-01 NOTE — TELEPHONE ENCOUNTER
Can you place an order for blood work for his upcoming physical?  He will stop in tomorrow (9-2) to [facility] it up  Just let me know when placed, I will print out and have ready for him

## 2022-09-17 LAB
ALBUMIN SERPL-MCNC: 4.3 G/DL (ref 3.6–5.1)
ALBUMIN/GLOB SERPL: 2.3 (CALC) (ref 1–2.5)
ALP SERPL-CCNC: 77 U/L (ref 35–144)
ALT SERPL-CCNC: 16 U/L (ref 9–46)
APPEARANCE UR: CLEAR
AST SERPL-CCNC: 15 U/L (ref 10–35)
BASOPHILS # BLD AUTO: 33 CELLS/UL (ref 0–200)
BASOPHILS NFR BLD AUTO: 0.4 %
BILIRUB SERPL-MCNC: 1.2 MG/DL (ref 0.2–1.2)
BILIRUB UR QL STRIP: NEGATIVE
BUN SERPL-MCNC: 22 MG/DL (ref 7–25)
BUN/CREAT SERPL: NORMAL (CALC) (ref 6–22)
CALCIUM SERPL-MCNC: 9.1 MG/DL (ref 8.6–10.3)
CAOX CRY #/AREA URNS HPF: ABNORMAL /HPF
CHLORIDE SERPL-SCNC: 103 MMOL/L (ref 98–110)
CHOLEST SERPL-MCNC: 158 MG/DL
CHOLEST/HDLC SERPL: 2.5 (CALC)
CO2 SERPL-SCNC: 28 MMOL/L (ref 20–32)
COLOR UR: YELLOW
CREAT SERPL-MCNC: 1.18 MG/DL (ref 0.7–1.35)
EOSINOPHIL # BLD AUTO: 98 CELLS/UL (ref 15–500)
EOSINOPHIL NFR BLD AUTO: 1.2 %
ERYTHROCYTE [DISTWIDTH] IN BLOOD BY AUTOMATED COUNT: 12.5 % (ref 11–15)
GFR/BSA.PRED SERPLBLD CYS-BASED-ARV: 69 ML/MIN/1.73M2
GLOBULIN SER CALC-MCNC: 1.9 G/DL (CALC) (ref 1.9–3.7)
GLUCOSE SERPL-MCNC: 81 MG/DL (ref 65–99)
GLUCOSE UR QL STRIP: NEGATIVE
HCT VFR BLD AUTO: 49.2 % (ref 38.5–50)
HDLC SERPL-MCNC: 64 MG/DL
HGB BLD-MCNC: 16.2 G/DL (ref 13.2–17.1)
HGB UR QL STRIP: NEGATIVE
HYALINE CASTS #/AREA URNS LPF: ABNORMAL /LPF
KETONES UR QL STRIP: NEGATIVE
LDLC SERPL CALC-MCNC: 83 MG/DL (CALC)
LEUKOCYTE ESTERASE UR QL STRIP: NEGATIVE
LYMPHOCYTES # BLD AUTO: 2378 CELLS/UL (ref 850–3900)
LYMPHOCYTES NFR BLD AUTO: 29 %
MCH RBC QN AUTO: 29.7 PG (ref 27–33)
MCHC RBC AUTO-ENTMCNC: 32.9 G/DL (ref 32–36)
MCV RBC AUTO: 90.1 FL (ref 80–100)
MONOCYTES # BLD AUTO: 517 CELLS/UL (ref 200–950)
MONOCYTES NFR BLD AUTO: 6.3 %
NEUTROPHILS # BLD AUTO: 5174 CELLS/UL (ref 1500–7800)
NEUTROPHILS NFR BLD AUTO: 63.1 %
NITRITE UR QL STRIP: NEGATIVE
NONHDLC SERPL-MCNC: 94 MG/DL (CALC)
PH UR STRIP: ABNORMAL [PH] (ref 5–8)
PLATELET # BLD AUTO: 216 THOUSAND/UL (ref 140–400)
PMV BLD REES-ECKER: 9.5 FL (ref 7.5–12.5)
POTASSIUM SERPL-SCNC: 4 MMOL/L (ref 3.5–5.3)
PROT SERPL-MCNC: 6.2 G/DL (ref 6.1–8.1)
PROT UR QL STRIP: NEGATIVE
PSA SERPL-MCNC: 0.16 NG/ML
RBC # BLD AUTO: 5.46 MILLION/UL (ref 4.2–5.8)
RBC #/AREA URNS HPF: ABNORMAL /HPF
SODIUM SERPL-SCNC: 140 MMOL/L (ref 135–146)
SP GR UR STRIP: 1.02 (ref 1–1.03)
SQUAMOUS #/AREA URNS HPF: ABNORMAL /HPF
TRIGL SERPL-MCNC: 39 MG/DL
WBC # BLD AUTO: 8.2 THOUSAND/UL (ref 3.8–10.8)
WBC #/AREA URNS HPF: ABNORMAL /HPF

## 2022-09-23 ENCOUNTER — OFFICE VISIT (OUTPATIENT)
Dept: INTERNAL MEDICINE CLINIC | Facility: CLINIC | Age: 62
End: 2022-09-23
Payer: COMMERCIAL

## 2022-09-23 VITALS
BODY MASS INDEX: 26.22 KG/M2 | HEIGHT: 69 IN | OXYGEN SATURATION: 98 % | HEART RATE: 78 BPM | RESPIRATION RATE: 16 BRPM | WEIGHT: 177 LBS | TEMPERATURE: 97.7 F | DIASTOLIC BLOOD PRESSURE: 82 MMHG | SYSTOLIC BLOOD PRESSURE: 122 MMHG

## 2022-09-23 DIAGNOSIS — F41.1 GENERALIZED ANXIETY DISORDER: ICD-10-CM

## 2022-09-23 DIAGNOSIS — M17.31 POST-TRAUMATIC OSTEOARTHRITIS OF RIGHT KNEE: ICD-10-CM

## 2022-09-23 DIAGNOSIS — Z85.46 HISTORY OF PROSTATE CANCER: ICD-10-CM

## 2022-09-23 DIAGNOSIS — I10 ESSENTIAL HYPERTENSION: ICD-10-CM

## 2022-09-23 DIAGNOSIS — Z23 ENCOUNTER FOR IMMUNIZATION: ICD-10-CM

## 2022-09-23 DIAGNOSIS — Z00.00 ANNUAL PHYSICAL EXAM: Primary | ICD-10-CM

## 2022-09-23 PROBLEM — M25.50 POLYARTHRALGIA: Status: RESOLVED | Noted: 2020-11-23 | Resolved: 2022-09-23

## 2022-09-23 PROBLEM — R82.998 CALCIUM OXALATE CRYSTALS IN URINE: Status: ACTIVE | Noted: 2022-09-23

## 2022-09-23 PROCEDURE — 90471 IMMUNIZATION ADMIN: CPT

## 2022-09-23 PROCEDURE — 99396 PREV VISIT EST AGE 40-64: CPT | Performed by: INTERNAL MEDICINE

## 2022-09-23 PROCEDURE — 3725F SCREEN DEPRESSION PERFORMED: CPT | Performed by: INTERNAL MEDICINE

## 2022-09-23 PROCEDURE — 90682 RIV4 VACC RECOMBINANT DNA IM: CPT

## 2022-09-23 NOTE — PROGRESS NOTES
[facility] INTERNAL MEDICINE    NAME: Shekhar Arnold  AGE: 59 y o  SEX: male  : 1960     DATE: 2022     Assessment and Plan:     Problem List Items Addressed This Visit        Cardiovascular and Mediastinum    Essential hypertension - Primary       Musculoskeletal and Integument    Post-traumatic osteoarthritis of right knee       Other    History of prostate cancer    Generalized anxiety disorder    RESOLVED: Depression with anxiety      Other Visit Diagnoses     Annual physical exam              Immunizations and preventive care screenings were discussed with patient today  Appropriate education was printed on patient's after visit summary  Counseling:  · Exercise: the importance of regular exercise/physical activity was discussed  Recommend exercise 3-5 times per week for at least 30 minutes  Follow-up will be in 1 year  Colonoscopies every 5 years, last performed in 2019 by private group  Ongoing follow-up continues with Urology with history of prostate cancer  PSA result was reviewed  Hypertension is no longer an active diagnosis and will be retired  Depression is not an active diagnosis and will be retired  Flu shot was given today and Ciro España will proceed with the new COVID booster as well  Follow-up continues twice year with Dermatology with family history of melanoma in his father  There is no personal history of melanoma or skin cancer  Chief Complaint:     Annual physical   History of Present Illness:     Adult Annual Physical   Patient here for a comprehensive physical exam  The patient reports no problems  Ciro España is enjoying custodial and is now on a good routine whereby he is playing pickleball regularly and also going to the gym  He feels well and stress levels are much reduced since he retired        Does continue active follow-up with Urology with history of prostate cancer, and his annual preventive labs that I ordered included a PSA which was 0 16, within range of error of previous value  There are no urinary complaints  CMP is normal, lipids are normal including total cholesterol 158, CBC normal, and urinalysis shows calcium oxalate crystals  We discussed that small crystals can form to stones  There is no history of renal colic mother nephrolithiasis related presentations  We discussed this is most likely genetic  Plans to well hydrated to sit on the urine by adding lemon with water or drinking cranberry juice  Will continue to monitor  If there would be symptomatic disease, low oxalate diet would be the next step  Diet and Physical Activity  · Diet/Nutrition: well balanced diet  · Exercise: vigorous cardiovascular exercise  Depression Screening  PHQ-2/9 Depression Screening         General Health  · Sleep: sleeps well  · Hearing: normal - bilateral   · Vision: goes for regular eye exams  · Dental: regular dental visits   Health  · Symptoms include: none     Review of Systems:     Review of Systems   Constitutional: Negative  HENT: Negative  Eyes: Negative  Respiratory: Negative  Cardiovascular: Negative  Gastrointestinal: Negative  Endocrine: Negative  Genitourinary: Negative  Musculoskeletal: Positive for arthralgias  Negative for gait problem  Skin: Negative  Allergic/Immunologic: Negative  Neurological: Negative  Hematological: Negative  Psychiatric/Behavioral: Negative for dysphoric mood, sleep disturbance and suicidal ideas  The patient is nervous/anxious         Past Medical History:     Past Medical History:   Diagnosis Date    Adenocarcinoma of prostate ([address] )     last assessed 05/12/2015    Bladder neck contracture     Colon polyps     last assessed 05/12/2015    Erectile dysfunction following radical prostatectomy     Feeling of incomplete bladder emptying     Squamous cell carcinoma of skin     last assessed 12/30/2016      Past Surgical History:     Past Surgical History:   Procedure Laterality Date    ARTHROSCOPY ANKLE  2010    BACK SURGERY      COLONOSCOPY      last assessed 05/12/2015    KNEE SURGERY Right     last assessed 06/06/2016    PROSTATECTOMY  2008    last assessed 05/12/2015      Family History:     Family History   Problem Relation Age of Onset    Coronary artery disease Mother     Coronary artery disease Father     Melanoma Father       Social History:     Social History     Socioeconomic History    Marital status: /Civil Union     Spouse name: None    Number of children: None    Years of education: None    Highest education level: None   Occupational History    None   Tobacco Use    Smoking status: Former Smoker    Smokeless tobacco: Never Used   Substance and Sexual Activity    Alcohol use: Yes    Drug use: No    Sexual activity: None   Other Topics Concern    None   Social History Narrative    None     Social Determinants of Health     Financial Resource Strain: Not on file   Food Insecurity: Not on file   Transportation Needs: Not on file   Physical Activity: Not on file   Stress: Not on file   Social Connections: Not on file   Intimate Partner Violence: Not on file   Housing Stability: Not on file      Current Medications:     Current Outpatient Medications   Medication Sig Dispense Refill    clonazePAM (KlonoPIN) 1 mg tablet Take 1 mg by mouth daily as needed  0     No current facility-administered medications for this visit  Allergies:     No Known Allergies   Physical Exam:     /82   Pulse 78   Temp 97 7 °F (36 5 °C)   Resp 16   Ht 5' 9" (1 753 m)   Wt 80 3 kg (177 lb)   SpO2 98%   BMI 26 14 kg/m²     Physical Exam     VSS, afebrile, pulse regular    Alert and Oriented in NAD    HEENT: NCAT, Pupils equal, 3 mm, EOEMI, no icterus or pallor, no iritis or conjunctivitis  No head or neck mass or adenopathy       Ears: normal-appearing external auditory canals and tympanic membranes,     Nose: patent nasal passages without polyps or masses, no septal deviation, no nasal deformity,     Oral cavity and throat: normal dentition, no gum disease, normal mucosa, patent airway, no hemorrhages or exudates in the throat  Exam of salivary glands and ducts are normal  No submandibular or submental adenopathy    Neck: supple, no trauma or tenderness  No JVD  Normal carotid upstroke and descent without bruits  Trachea midline without stridor  Thyroid exam normal on inspection and palpation  No spinal tenderness, full range of motion  Lymphatics: no adenopathy throughout    Chest:  No trauma or deformity, no supraclavicular adenopathy or bruit  Chest wall expansion is symmetric and normal, expiratory phase is not prolonged  Heart:  Regular rate and rhythm, normal L9-W5, no L2-B5, no clicks murmurs or rubs  Lungs:  Clear to auscultation and normal to percussion  Back:  No trauma or deformity, no CVA mass or CVA tenderness  Skin:  No rash or skin malignancy  Abdomen:  Nondistended, normal bowel sounds, soft nontender without masses bruits organomegaly  There is no hernia  Extremities:  Arterial circulation is symmetrically normal with no clubbing cyanosis or edema  There are no varicosities, there is no calf tenderness or phlebitic findings  Joints:  Diagonal scar over medial anterior aspect of the left knee is unchanged  There is no instability or effusion  Gait is normal   There are no tophi  Affect:  Normal    Neurologic:  Normal and stable gait, and otherwise no focal findings as well  Cognitive: Intact          Kenisha Marshall MD  [address] [place] [address] INTERNAL MEDICINE

## 2022-09-23 NOTE — PATIENT INSTRUCTIONS

## 2022-10-04 ENCOUNTER — OFFICE VISIT (OUTPATIENT)
Dept: UROLOGY | Facility: MEDICAL CENTER | Age: 62
End: 2022-10-04
Payer: COMMERCIAL

## 2022-10-04 VITALS
DIASTOLIC BLOOD PRESSURE: 78 MMHG | OXYGEN SATURATION: 96 % | SYSTOLIC BLOOD PRESSURE: 124 MMHG | HEIGHT: 69 IN | BODY MASS INDEX: 26.96 KG/M2 | HEART RATE: 64 BPM | WEIGHT: 182 LBS

## 2022-10-04 DIAGNOSIS — Z85.46 HISTORY OF PROSTATE CANCER: Primary | ICD-10-CM

## 2022-10-04 DIAGNOSIS — N52.31 ERECTILE DYSFUNCTION FOLLOWING RADICAL PROSTATECTOMY: ICD-10-CM

## 2022-10-04 PROCEDURE — 99214 OFFICE O/P EST MOD 30 MIN: CPT | Performed by: UROLOGY

## 2022-10-04 NOTE — ASSESSMENT & PLAN NOTE
He is doing well post radical prostatectomy in 2008  His most recent PSA is stable at 0 16 (September 16, 2022)  AUA symptom score is 6  He is satisfied his voiding pattern  We will continue to follow his voiding pattern and we will continue to check PSAs at 6 month intervals  He will return in 1 year if his PSAs remain stable

## 2022-10-04 NOTE — PROGRESS NOTES
Assessment/Plan:    History of prostate cancer  He is doing well post radical prostatectomy in 2008  His most recent PSA is stable at 0 16 (September 16, 2022)  AUA symptom score is 6  He is satisfied his voiding pattern  We will continue to follow his voiding pattern and we will continue to check PSAs at 6 month intervals  He will return in 1 year if his PSAs remain stable  Erectile dysfunction following radical prostatectomy  Options were discussed with the patient  We discussed vacuum erection device, injection therapy which he has tried before and placement of a penile prosthesis  He has seen information on wave therapy which is not recommended at this time  He is not interested in therapy at this time  Diagnoses and all orders for this visit:    History of prostate cancer  -     PSA Total, Diagnostic; Future  -     PSA Total, Diagnostic; Future  -     Urinalysis with microscopic; Future    Erectile dysfunction following radical prostatectomy          Subjective:      Patient ID: Gregorio Hui is a 59 y o  male  Chief complaint:  Prostate cancer, erectile dysfunction    HPI:  57-year-old male status post radical prostatectomy in 2008 for a T2c [place] 3+3=6 prostate cancer  Surgical margins were negative  He notes he is doing well  He voids adequately  His control is good  He does not wear pads  There is no gross hematuria, dysuria or symptoms of infection  There is no new back or bone pain  He has no constitutional symptoms  He continues to have post prostatectomy erectile dysfunction  The following portions of the patient's history were reviewed and updated as appropriate: allergies, current medications, past family history, past medical history, past social history, past surgical history and problem list     Review of Systems   Constitutional: Negative for chills, diaphoresis, fatigue and fever  HENT: Negative  Eyes: Negative  Respiratory: Negative      Cardiovascular: Negative  Gastrointestinal: Negative  Endocrine: Negative  Genitourinary:        See HPI   Musculoskeletal: Negative  Skin: Negative  Allergic/Immunologic: Negative  Neurological: Negative  Hematological: Negative  Psychiatric/Behavioral: Negative  AUA SYMPTOM SCORE    Flowsheet Row Most Recent Value   AUA SYMPTOM SCORE    How often have you had a sensation of not emptying your bladder completely after you finished urinating? 0   How often have you had to urinate again less than two hours after you finished urinating? 1   How often have you found you stopped and started again several times when you urinate? 0   How often have you found it difficult to postpone urination? 1   How often have you had a weak urinary stream? 2   How often have you had to push or strain to begin urination? 0   How many times did you most typically get up to urinate from the time you went to bed at night until the time you got up in the morning? 2   Quality of Life: If you were to spend the rest of your life with your urinary condition just the way it is now, how would you feel about that? 2   AUA SYMPTOM SCORE 6        Objective:      /78   Pulse 64   Ht 5' 9" (1 753 m)   Wt 82 6 kg (182 lb)   SpO2 96%   BMI 26 88 kg/m²          Physical Exam  Vitals reviewed  Constitutional:       General: He is not in acute distress  Appearance: Normal appearance  He is well-developed and normal weight  He is not ill-appearing, toxic-appearing or diaphoretic  HENT:      Head: Normocephalic and atraumatic  Eyes:      General: No scleral icterus  Conjunctiva/sclera: Conjunctivae normal    Cardiovascular:      Rate and Rhythm: Normal rate  Pulmonary:      Effort: Pulmonary effort is normal    Abdominal:      General: Bowel sounds are normal  There is no distension  Palpations: Abdomen is soft  There is no mass  Tenderness: There is no abdominal tenderness   There is no right CVA tenderness, left CVA tenderness, guarding or rebound  Hernia: No hernia is present  Genitourinary:     Penis: Normal  No phimosis or hypospadias  Testes: Normal          Right: Mass not present  Left: Mass not present  Rectum: Normal       Comments: Empty prostatic fossa  Musculoskeletal:         General: Normal range of motion  Cervical back: Neck supple  Skin:     General: Skin is warm and dry  Neurological:      General: No focal deficit present  Mental Status: He is alert and oriented to person, place, and time  Psychiatric:         Mood and Affect: Mood normal          Behavior: Behavior normal          Thought Content:  Thought content normal          Judgment: Judgment normal

## 2022-10-04 NOTE — ASSESSMENT & PLAN NOTE
Options were discussed with the patient  We discussed vacuum erection device, injection therapy which he has tried before and placement of a penile prosthesis  He has seen information on wave therapy which is not recommended at this time  He is not interested in therapy at this time

## 2023-08-08 ENCOUNTER — TELEPHONE (OUTPATIENT)
Dept: INTERNAL MEDICINE CLINIC | Facility: CLINIC | Age: 63
End: 2023-08-08

## 2023-08-08 DIAGNOSIS — I10 ESSENTIAL HYPERTENSION: Primary | ICD-10-CM

## 2023-08-08 DIAGNOSIS — Z13.0 SCREENING FOR DEFICIENCY ANEMIA: ICD-10-CM

## 2023-08-08 DIAGNOSIS — Z13.1 SCREENING FOR DIABETES MELLITUS: ICD-10-CM

## 2023-08-08 DIAGNOSIS — Z13.220 SCREENING FOR HYPERLIPIDEMIA: ICD-10-CM

## 2023-08-22 ENCOUNTER — TELEPHONE (OUTPATIENT)
Age: 63
End: 2023-08-22

## 2023-08-22 NOTE — TELEPHONE ENCOUNTER
PT called to schedule 1 yr follow up. Pt will need new scripts for PSA  And UA  Mailed to him.    Address verified  Pt  [phone]

## 2023-08-23 DIAGNOSIS — Z85.46 HISTORY OF PROSTATE CANCER: Primary | ICD-10-CM

## 2023-09-18 LAB
ALBUMIN SERPL-MCNC: 4.5 G/DL (ref 3.6–5.1)
ALBUMIN/GLOB SERPL: 2.3 (CALC) (ref 1–2.5)
ALP SERPL-CCNC: 78 U/L (ref 35–144)
ALT SERPL-CCNC: 16 U/L (ref 9–46)
APPEARANCE UR: CLEAR
AST SERPL-CCNC: 16 U/L (ref 10–35)
BASOPHILS # BLD AUTO: 48 CELLS/UL (ref 0–200)
BASOPHILS NFR BLD AUTO: 0.8 %
BILIRUB SERPL-MCNC: 1.1 MG/DL (ref 0.2–1.2)
BILIRUB UR QL STRIP: NEGATIVE
BUN SERPL-MCNC: 21 MG/DL (ref 7–25)
BUN/CREAT SERPL: ABNORMAL (CALC) (ref 6–22)
CALCIUM SERPL-MCNC: 9.2 MG/DL (ref 8.6–10.3)
CHLORIDE SERPL-SCNC: 105 MMOL/L (ref 98–110)
CO2 SERPL-SCNC: 28 MMOL/L (ref 20–32)
COLOR UR: YELLOW
CREAT SERPL-MCNC: 1.15 MG/DL (ref 0.7–1.35)
EOSINOPHIL # BLD AUTO: 90 CELLS/UL (ref 15–500)
EOSINOPHIL NFR BLD AUTO: 1.5 %
ERYTHROCYTE [DISTWIDTH] IN BLOOD BY AUTOMATED COUNT: 12.4 % (ref 11–15)
GFR/BSA.PRED SERPLBLD CYS-BASED-ARV: 71 ML/MIN/1.73M2
GLOBULIN SER CALC-MCNC: 2 G/DL (CALC) (ref 1.9–3.7)
GLUCOSE SERPL-MCNC: 103 MG/DL (ref 65–99)
GLUCOSE UR QL STRIP: NEGATIVE
HBA1C MFR BLD: 4.9 % OF TOTAL HGB
HCT VFR BLD AUTO: 46.8 % (ref 38.5–50)
HGB BLD-MCNC: 15.7 G/DL (ref 13.2–17.1)
HGB UR QL STRIP: NEGATIVE
KETONES UR QL STRIP: NEGATIVE
LEUKOCYTE ESTERASE UR QL STRIP: NEGATIVE
LYMPHOCYTES # BLD AUTO: 1650 CELLS/UL (ref 850–3900)
LYMPHOCYTES NFR BLD AUTO: 27.5 %
MCH RBC QN AUTO: 29.2 PG (ref 27–33)
MCHC RBC AUTO-ENTMCNC: 33.5 G/DL (ref 32–36)
MCV RBC AUTO: 87 FL (ref 80–100)
MONOCYTES # BLD AUTO: 414 CELLS/UL (ref 200–950)
MONOCYTES NFR BLD AUTO: 6.9 %
NEUTROPHILS # BLD AUTO: 3798 CELLS/UL (ref 1500–7800)
NEUTROPHILS NFR BLD AUTO: 63.3 %
NITRITE UR QL STRIP: NEGATIVE
PH UR STRIP: NORMAL [PH] (ref 5–8)
PLATELET # BLD AUTO: 242 THOUSAND/UL (ref 140–400)
PMV BLD REES-ECKER: 9.4 FL (ref 7.5–12.5)
POTASSIUM SERPL-SCNC: 4.5 MMOL/L (ref 3.5–5.3)
PROT SERPL-MCNC: 6.5 G/DL (ref 6.1–8.1)
PROT UR QL STRIP: NEGATIVE
PSA SERPL-MCNC: 0.16 NG/ML
RBC # BLD AUTO: 5.38 MILLION/UL (ref 4.2–5.8)
SODIUM SERPL-SCNC: 140 MMOL/L (ref 135–146)
SP GR UR STRIP: 1.02 (ref 1–1.03)
WBC # BLD AUTO: 6 THOUSAND/UL (ref 3.8–10.8)

## 2023-09-19 ENCOUNTER — TELEPHONE (OUTPATIENT)
Dept: INTERNAL MEDICINE CLINIC | Facility: CLINIC | Age: 63
End: 2023-09-19

## 2023-09-19 NOTE — TELEPHONE ENCOUNTER
Pt concerned about lipid panel not being done and calcium oxalate crystals. "Hi, my name is Megan Sanchez. I had a blood test done yesterday that Doctor Emily nguyen had done for my physical that I'm having in October and I was comparing it to last year's blood test result and I noticed that there was no lipid panel done, which is the cholesterol stuff. I was wondering why that wasn't done. Maybe the results were they were done yesterday. I got the results today and maybe that takes longer, I don't know. And I also noticed that last time I was high with calcium oxalate crystals for my urinalysis, and that doesn't look like it was done either. I don't know if that's a problem or what's going on. My phone number is [phone]. Thank you again. The lipid panel wasn't done and the calcium oxalate crystals, I don't see that noted on any reader. Thank you very much, Dk Ochoa, wrote Tiffany Rodriguez [phone].  Thank you."

## 2023-09-21 LAB
CHOLEST SERPL-MCNC: 170 MG/DL
CHOLEST/HDLC SERPL: 2.7 (CALC)
HDLC SERPL-MCNC: 63 MG/DL
LDLC SERPL CALC-MCNC: 97 MG/DL (CALC)
NONHDLC SERPL-MCNC: 107 MG/DL (CALC)
TRIGL SERPL-MCNC: 35 MG/DL

## 2023-09-21 NOTE — TELEPHONE ENCOUNTER
Emigdio Núñez I wanted to follow back up on this. I see Poojaprachi Macyinocencio had a Urinalysis test done at the lab but it was a Urinalysis Macroscopic instead of a Urinalysis w/Microscopic.  Should this be recollected under the proper test?

## 2023-10-10 ENCOUNTER — OFFICE VISIT (OUTPATIENT)
Dept: INTERNAL MEDICINE CLINIC | Facility: CLINIC | Age: 63
End: 2023-10-10
Payer: COMMERCIAL

## 2023-10-10 VITALS
SYSTOLIC BLOOD PRESSURE: 120 MMHG | DIASTOLIC BLOOD PRESSURE: 84 MMHG | WEIGHT: 177.4 LBS | HEART RATE: 80 BPM | BODY MASS INDEX: 26.28 KG/M2 | OXYGEN SATURATION: 96 % | HEIGHT: 69 IN | TEMPERATURE: 97.9 F

## 2023-10-10 DIAGNOSIS — E66.3 OVERWEIGHT (BMI 25.0-29.9): ICD-10-CM

## 2023-10-10 DIAGNOSIS — Z00.00 ANNUAL PHYSICAL EXAM: Primary | ICD-10-CM

## 2023-10-10 DIAGNOSIS — Z23 ENCOUNTER FOR IMMUNIZATION: ICD-10-CM

## 2023-10-10 DIAGNOSIS — R82.998 CALCIUM OXALATE CRYSTALS IN URINE: ICD-10-CM

## 2023-10-10 DIAGNOSIS — F41.1 GENERALIZED ANXIETY DISORDER: ICD-10-CM

## 2023-10-10 DIAGNOSIS — I10 ESSENTIAL HYPERTENSION: ICD-10-CM

## 2023-10-10 PROCEDURE — 90686 IIV4 VACC NO PRSV 0.5 ML IM: CPT | Performed by: INTERNAL MEDICINE

## 2023-10-10 PROCEDURE — 90471 IMMUNIZATION ADMIN: CPT | Performed by: INTERNAL MEDICINE

## 2023-10-10 PROCEDURE — 99396 PREV VISIT EST AGE 40-64: CPT | Performed by: INTERNAL MEDICINE

## 2023-10-10 NOTE — PROGRESS NOTES
[address] [facility] INTERNAL MEDICINE    NAME: Adolfo Sullivan  AGE: 57 y.o. SEX: male  : 1960     DATE: 10/10/2023     Assessment and Plan:     Problem List Items Addressed This Visit        Unprioritized    Essential hypertension     Patient has well-controlled blood pressure 120/84 during this visit. Overweight (BMI 25.0-29. 9)     The patient is very healthy and is physically active. He admits to eating a lot of red meat however he also eats vegetables         Generalized anxiety disorder     Anxiety is well controlled  Patient has Klonopin 1 mg daily as needed which she has not been using         Calcium oxalate crystals in urine     Patient does not have any calcium oxalate crystals in his urine this time. We will continue to monitor with urinalysis in the future        Other Visit Diagnoses     Annual physical exam    -  Primary    Encounter for immunization        Relevant Orders    influenza vaccine, quadrivalent, 0.5 mL, preservative-free, for adult and pediatric patients 6 mos+ (AFLURIA, FLUARIX, FLULAVAL, FLUZONE)          Immunizations and preventive care screenings were discussed with patient today. Appropriate education was printed on patient's after visit summary. Discussed risks and benefits of prostate cancer screening. We discussed the controversial history of PSA screening for prostate cancer in the [facility] as well as the risk of over detection and over treatment of prostate cancer by way of PSA screening. The patient understands that PSA blood testing is an imperfect way to screen for prostate cancer and that elevated PSA levels in the blood may also be caused by infection, inflammation, prostatic trauma or manipulation, urological procedures, or by benign prostatic enlargement.     The role of the digital rectal examination in prostate cancer screening was also discussed and I discussed with him that there is large interobserver variability in the findings of digital rectal examination. Counseling:  Alcohol/drug use: discussed moderation in alcohol intake, the recommendations for healthy alcohol use, and avoidance of illicit drug use. Dental Health: discussed importance of regular tooth brushing, flossing, and dental visits. Injury prevention: discussed safety/seat belts, safety helmets, smoke detectors, carbon dioxide detectors, and smoking near bedding or upholstery. Sexual health: discussed sexually transmitted diseases, partner selection, use of condoms, avoidance of unintended pregnancy, and contraceptive alternatives. · Exercise: the importance of regular exercise/physical activity was discussed. Recommend exercise 3-5 times per week for at least 30 minutes. Follow-up after 1 year     Chief Complaint:     Coming in for annual physical   History of Present Illness:     Adult Annual Physical   Patient here for a comprehensive physical exam. The patient reports no problems. Patient has no subjective complaints. Coming in for annual physical.  No chest pain, no shortness of breath, no abdominal pain. Good appetite. Patient is very healthy and is physically active. He does exercise at least 4-5 times a week. He wakes up at 5 AM to go to the gym and does cycling classes. He plays a lot of pickleball recently. We reviewed his laboratory work and noted that it was all within normal levels. He will come back after 1 for another annual physical.  The patient will get his flu vaccine here today in the clinic  Diet and Physical Activity  · Diet/Nutrition: well balanced diet. Patient eats plenty of the red meat. Does eat vegetables occasionally  · Exercise: 3-4 times a week on average.       Depression Screening  PHQ-2/9 Depression Screening    Little interest or pleasure in doing things: 0 - not at all  Feeling down, depressed, or hopeless: 0 - not at all  PHQ-2 Score: 0  PHQ-2 Interpretation: Negative depression screen       General Health  · Sleep: gets 7-8 hours of sleep on average. · Hearing: normal - bilateral.  · Vision: no vision problems. · Dental: regular dental visits.  Health  · Symptoms include: erectile dysfunction    Advanced Care Planning  · Do you have an advanced directive? yes  · Do you have a durable medical power of ? yes     Review of Systems:     Review of Systems   Constitutional: Negative for chills and fever. HENT: Negative for ear pain and sore throat. Eyes: Negative for pain and visual disturbance. Respiratory: Negative for cough and shortness of breath. Cardiovascular: Negative for chest pain and palpitations. Gastrointestinal: Negative for abdominal pain and vomiting. Genitourinary: Negative for dysuria and hematuria. Musculoskeletal: Negative for arthralgias and back pain. Skin: Negative for color change and rash. Neurological: Negative for seizures and syncope. All other systems reviewed and are negative.      Past Medical History:     Past Medical History:   Diagnosis Date   • Adenocarcinoma of prostate ([address])     last assessed 05/12/2015   • Bladder neck contracture    • Colon polyps     last assessed 05/12/2015   • Erectile dysfunction following radical prostatectomy    • Feeling of incomplete bladder emptying    • Squamous cell carcinoma of skin     last assessed 12/30/2016      Past Surgical History:     Past Surgical History:   Procedure Laterality Date   • ARTHROSCOPY ANKLE  2010   • BACK SURGERY     • COLONOSCOPY      last assessed 05/12/2015   • KNEE SURGERY Right     last assessed 06/06/2016   • PROSTATECTOMY  2008    last assessed 05/12/2015      Family History:     Family History   Problem Relation Age of Onset   • Coronary artery disease Mother    • Coronary artery disease Father    • Melanoma Father       Social History:     Social History     Socioeconomic History   • Marital status: /Civil Union     Spouse name: None   • Number of children: None   • Years of education: None   • Highest education level: None   Occupational History   • None   Tobacco Use   • Smoking status: Former   • Smokeless tobacco: Never   Substance and Sexual Activity   • Alcohol use: Yes   • Drug use: No   • Sexual activity: None   Other Topics Concern   • None   Social History Narrative   • None     Social Determinants of Health     Financial Resource Strain: Not on file   Food Insecurity: Not on file   Transportation Needs: Not on file   Physical Activity: Not on file   Stress: Not on file   Social Connections: Not on file   Intimate Partner Violence: Not on file   Housing Stability: Not on file      Current Medications:     Current Outpatient Medications   Medication Sig Dispense Refill   • clonazePAM (KlonoPIN) 1 mg tablet Take 1 mg by mouth daily as needed  0     No current facility-administered medications for this visit. Allergies:     No Known Allergies   Physical Exam:     /84   Pulse 80   Temp 97.9 °F (36.6 °C)   Ht 5' 9" (1.753 m)   Wt 80.5 kg (177 lb 6.4 oz)   SpO2 96%   BMI 26.20 kg/m²     Physical Exam  Vitals and nursing note reviewed. Constitutional:       Appearance: He is well-developed and normal weight. HENT:      Head: Normocephalic and atraumatic. Nose: Nose normal.      Mouth/Throat:      Mouth: Mucous membranes are moist.   Eyes:      Conjunctiva/sclera: Conjunctivae normal.   Cardiovascular:      Rate and Rhythm: Normal rate and regular rhythm. Heart sounds: Normal heart sounds. No murmur heard. Pulmonary:      Effort: Pulmonary effort is normal. No respiratory distress. Breath sounds: Normal breath sounds. Abdominal:      General: Abdomen is flat. Palpations: Abdomen is soft. Tenderness: There is no abdominal tenderness. Musculoskeletal:         General: No swelling. Cervical back: Neck supple. Right lower leg: No edema. Left lower leg: No edema.    Skin: General: Skin is warm and dry. Capillary Refill: Capillary refill takes less than 2 seconds. Neurological:      General: No focal deficit present. Mental Status: He is alert and oriented to person, place, and time. Mental status is at baseline.    Psychiatric:         Mood and Affect: Mood normal.          Renate Lazar MD  [address] [facility]

## 2023-10-10 NOTE — ASSESSMENT & PLAN NOTE
Patient does not have any calcium oxalate crystals in his urine this time.   We will continue to monitor with urinalysis in the future

## 2023-10-10 NOTE — ASSESSMENT & PLAN NOTE
Lab Results   Component Value Date    PSA 0.16 09/18/2023    PSA 0.16 09/16/2022    PSA 0.13 09/15/2021     Patient is status post prostatectomy in 2008. Has been following with urology as outpatient.   Currently checking PSAs in 6-month intervals.

## 2023-10-10 NOTE — ASSESSMENT & PLAN NOTE
The patient is very healthy and is physically active.   He admits to eating a lot of red meat however he also eats vegetables

## 2023-11-07 ENCOUNTER — OFFICE VISIT (OUTPATIENT)
Dept: UROLOGY | Facility: MEDICAL CENTER | Age: 63
End: 2023-11-07
Payer: MEDICARE

## 2023-11-07 VITALS
OXYGEN SATURATION: 95 % | SYSTOLIC BLOOD PRESSURE: 112 MMHG | HEIGHT: 69 IN | WEIGHT: 181 LBS | HEART RATE: 74 BPM | BODY MASS INDEX: 26.81 KG/M2 | DIASTOLIC BLOOD PRESSURE: 70 MMHG

## 2023-11-07 DIAGNOSIS — N52.31 ERECTILE DYSFUNCTION FOLLOWING RADICAL PROSTATECTOMY: ICD-10-CM

## 2023-11-07 DIAGNOSIS — Z85.46 HISTORY OF PROSTATE CANCER: Primary | ICD-10-CM

## 2023-11-07 PROCEDURE — 99214 OFFICE O/P EST MOD 30 MIN: CPT | Performed by: UROLOGY

## 2023-11-07 NOTE — ASSESSMENT & PLAN NOTE
We discussed option of penile prosthesis placement. At this point he is content to continue with observation.

## 2023-11-07 NOTE — PROGRESS NOTES
Assessment/Plan:    History of prostate cancer  He is doing well 15 years post radical prostatectomy. He is pleased with his voiding pattern and control is adequate. PSA remains stable at 0.16. We will continue to check PSAs at 6-month intervals. He will return in 1 year. Erectile dysfunction following radical prostatectomy  We discussed option of penile prosthesis placement. At this point he is content to continue with observation. Diagnoses and all orders for this visit:    History of prostate cancer  -     PSA Total, Diagnostic; Future  -     PSA Total, Diagnostic; Future  -     Urinalysis with microscopic; Future    Erectile dysfunction following radical prostatectomy          Subjective:      Patient ID: Sierra Swanson is a 57 y.o. male. Chief complaint:  Prostate cancer, erectile dysfunction    HPI:  58-year-old male status post radical prostatectomy in 2008 for a T2c Harmon 3+3=6 prostate cancer. Surgical margins were negative. He notes he is doing well. He voids adequately. His control is generally good and occasional episodes of stress incontinence. He does not wear pads. There is no gross hematuria, dysuria or symptoms of infection. There is no new back or bone pain. He has no constitutional symptoms. He has noted no changes since his visit last year. He continues to have post prostatectomy erectile dysfunction. The following portions of the patient's history were reviewed and updated as appropriate: allergies, current medications, past family history, past medical history, past social history, past surgical history and problem list.    Review of Systems   Constitutional: Negative. Negative for chills, diaphoresis, fatigue and fever. HENT: Negative. Eyes: Negative. Respiratory: Negative. Cardiovascular: Negative. Gastrointestinal: Negative. Endocrine: Negative. Genitourinary:         See HPI   Musculoskeletal: Negative. Skin: Negative. Allergic/Immunologic: Negative. Neurological: Negative. Hematological: Negative. Psychiatric/Behavioral: Negative. Objective:      /70 (BP Location: Left arm, Patient Position: Sitting, Cuff Size: Large)   Pulse 74   Ht 5' 9" (1.753 m)   Wt 82.1 kg (181 lb)   SpO2 95%   BMI 26.73 kg/m²          Physical Exam  Vitals reviewed. Constitutional:       General: He is not in acute distress. Appearance: Normal appearance. He is well-developed and normal weight. He is not ill-appearing, toxic-appearing or diaphoretic. HENT:      Head: Normocephalic and atraumatic. Eyes:      General: No scleral icterus. Conjunctiva/sclera: Conjunctivae normal.   Cardiovascular:      Rate and Rhythm: Normal rate. Pulmonary:      Effort: Pulmonary effort is normal.   Abdominal:      General: Bowel sounds are normal. There is no distension. Palpations: Abdomen is soft. There is no mass. Tenderness: There is no abdominal tenderness. There is no right CVA tenderness, left CVA tenderness, guarding or rebound. Hernia: No hernia is present. Genitourinary:     Penis: Normal. No phimosis or hypospadias. Testes: Normal.         Right: Mass not present. Left: Mass not present. Rectum: Normal.      Comments: Empty prostatic fossa  Musculoskeletal:         General: Normal range of motion. Cervical back: Normal range of motion and neck supple. Skin:     General: Skin is warm and dry. Neurological:      General: No focal deficit present. Mental Status: He is alert and oriented to person, place, and time. Mental status is at baseline. Psychiatric:         Mood and Affect: Mood normal.         Behavior: Behavior normal.         Thought Content:  Thought content normal.         Judgment: Judgment normal.

## 2023-11-07 NOTE — ASSESSMENT & PLAN NOTE
He is doing well 15 years post radical prostatectomy. He is pleased with his voiding pattern and control is adequate. PSA remains stable at 0.16. We will continue to check PSAs at 6-month intervals. He will return in 1 year.

## 2023-11-13 ENCOUNTER — OFFICE VISIT (OUTPATIENT)
Dept: INTERNAL MEDICINE CLINIC | Facility: CLINIC | Age: 63
End: 2023-11-13
Payer: MEDICARE

## 2023-11-13 VITALS
WEIGHT: 180 LBS | DIASTOLIC BLOOD PRESSURE: 78 MMHG | OXYGEN SATURATION: 97 % | SYSTOLIC BLOOD PRESSURE: 118 MMHG | HEIGHT: 69 IN | TEMPERATURE: 97.9 F | BODY MASS INDEX: 26.66 KG/M2 | HEART RATE: 66 BPM

## 2023-11-13 DIAGNOSIS — R05.1 ACUTE COUGH: Primary | ICD-10-CM

## 2023-11-13 DIAGNOSIS — B97.89 VIRAL RESPIRATORY ILLNESS: ICD-10-CM

## 2023-11-13 DIAGNOSIS — J98.8 VIRAL RESPIRATORY ILLNESS: ICD-10-CM

## 2023-11-13 PROCEDURE — 99213 OFFICE O/P EST LOW 20 MIN: CPT | Performed by: INTERNAL MEDICINE

## 2023-11-13 RX ORDER — BENZONATATE 200 MG/1
200 CAPSULE ORAL 3 TIMES DAILY PRN
Qty: 20 CAPSULE | Refills: 2 | Status: SHIPPED | OUTPATIENT
Start: 2023-11-13

## 2023-11-13 NOTE — PROGRESS NOTES
INTERNAL MEDICINE OFFICE VISIT       NAME: Ginna Collins  AGE: 57 y.o. SEX: male       : 1960        MRN: [id number]    DATE: 2023  TIME: 10:59 AM    Assessment and Plan   1. Acute cough  -     benzonatate (TESSALON) 200 MG capsule; Take 1 capsule (200 mg total) by mouth 3 (three) times a day as needed for cough    2. Viral respiratory illness  -     benzonatate (TESSALON) 200 MG capsule; Take 1 capsule (200 mg total) by mouth 3 (three) times a day as needed for cough         Follow-up will be as needed        Chief Complaint   Cold and cough    History of Present Illness   Ginna Collins is a 57y.o.-year-old male who has a 2-day history of viral symptoms consisting of nasal congestion, some postnasal drainage, hoarseness of voice, dry cough. There is no wheezing, no dyspnea, no fever, no chills, no night sweats. No GI symptoms are reported. He was had a large tailgate party just prior to onset of symptoms and may have been exposed to this germ then. The cough is most annoying thing and keeping him up at night. He tested negative for COVID twice. We discussed diagnosis of viral respiratory illness and I recommended treating the cough with Tessalon plus Mucinex. I suggested either ibuprofen or Tylenol as needed. Willie Hayes feels he is doing well enough that he was able to exercise this morning and is mainly concerned about treating his cough so he can sleep. We reviewed the symptoms of bacterial secondary infection to contact me about if they would occur. Prognosis is good for full recovery. Review of Systems   Review of Systems as above    Active Problem List     Patient Active Problem List   Diagnosis    Erectile dysfunction following radical prostatectomy    Essential hypertension    History of prostate cancer    Overweight (BMI 25.0-29. 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    Objective     Vitals:    11/13/23 1044   BP: 118/78   Pulse: 66   Temp: 97.9 °F (36.6 °C)   SpO2: 97%     Wt Readings from Last 3 Encounters:   11/13/23 81.6 kg (180 lb)   11/07/23 82.1 kg (181 lb)   10/10/23 80.5 kg (177 lb 6.4 oz)       Physical Exam    Vital signs stable, in no distress. Voice is slightly hoarse. Skin well hydrated, no rash. ENT exam: Markable for clear nasal mucus and postnasal drainage. Airways are patent. There are no hemorrhages or exudates of the throat. Trachea midline without stridor. There is no JVD. There is no cervical adenopathy. The lungs are clear throughout. Cardiac: Regular rate and rhythm, normal S1 and S2, no murmur, no S4 or S3. ALLERGIES:  No Known Allergies    Current Medications     Current Outpatient Medications   Medication Sig Dispense Refill    benzonatate (TESSALON) 200 MG capsule Take 1 capsule (200 mg total) by mouth 3 (three) times a day as needed for cough 20 capsule 2     No current facility-administered medications for this visit.          Allyssa Earl MD

## 2023-12-05 ENCOUNTER — TELEPHONE (OUTPATIENT)
Dept: INTERNAL MEDICINE CLINIC | Facility: CLINIC | Age: 63
End: 2023-12-05

## 2023-12-05 DIAGNOSIS — R05.1 ACUTE COUGH: ICD-10-CM

## 2023-12-05 DIAGNOSIS — J98.8 VIRAL RESPIRATORY ILLNESS: ICD-10-CM

## 2023-12-05 DIAGNOSIS — B97.89 VIRAL RESPIRATORY ILLNESS: ICD-10-CM

## 2023-12-05 RX ORDER — BENZONATATE 200 MG/1
200 CAPSULE ORAL 3 TIMES DAILY PRN
Qty: 20 CAPSULE | Refills: 2 | Status: SHIPPED | OUTPATIENT
Start: 2023-12-05

## 2023-12-05 NOTE — TELEPHONE ENCOUNTER
Hi Dr. Colby Mnae,  Patient called and stated that he is still experiencing the same cough that he came in for previously. Patient is asking if he can be prescribed more of the Tessalon or if he should come in for a visit. Patient can be reached at: [phone]. Thank you.

## 2024-05-10 ENCOUNTER — TELEPHONE (OUTPATIENT)
Dept: UROLOGY | Facility: MEDICAL CENTER | Age: 64
End: 2024-05-10

## 2024-05-10 NOTE — TELEPHONE ENCOUNTER
Called patient - LMOM that his PSA was stable .  He is asked to call back if he has any further questions or concerns.

## 2024-05-10 NOTE — TELEPHONE ENCOUNTER
----- Message from Pedro Minaya MD sent at 5/9/2024 10:09 AM EDT -----  Inform pt that the  test was stable. Keep usual follow up appt.

## 2024-08-28 ENCOUNTER — TELEPHONE (OUTPATIENT)
Age: 64
End: 2024-08-28

## 2024-08-28 NOTE — TELEPHONE ENCOUNTER
Pt has an infected toe nail requested antibiotic please follow up with pt   [facility] [address] [facility], [place] [address]

## 2024-09-26 DIAGNOSIS — Z13.1 SCREENING FOR DIABETES MELLITUS: ICD-10-CM

## 2024-09-26 DIAGNOSIS — Z13.220 SCREENING FOR HYPERLIPIDEMIA: ICD-10-CM

## 2024-09-26 DIAGNOSIS — Z85.46 HISTORY OF PROSTATE CANCER: ICD-10-CM

## 2024-09-26 DIAGNOSIS — I10 ESSENTIAL HYPERTENSION: Primary | ICD-10-CM

## 2024-09-26 DIAGNOSIS — R82.998 CALCIUM OXALATE CRYSTALS IN URINE: ICD-10-CM

## 2024-10-11 ENCOUNTER — OFFICE VISIT (OUTPATIENT)
Age: 64
End: 2024-10-11
Payer: MEDICARE

## 2024-10-11 VITALS
SYSTOLIC BLOOD PRESSURE: 122 MMHG | HEIGHT: 69 IN | DIASTOLIC BLOOD PRESSURE: 74 MMHG | WEIGHT: 165.2 LBS | OXYGEN SATURATION: 98 % | HEART RATE: 64 BPM | BODY MASS INDEX: 24.47 KG/M2

## 2024-10-11 DIAGNOSIS — Z85.46 HISTORY OF PROSTATE CANCER: ICD-10-CM

## 2024-10-11 DIAGNOSIS — Z12.11 SCREENING FOR COLON CANCER: ICD-10-CM

## 2024-10-11 DIAGNOSIS — Z00.00 ANNUAL PHYSICAL EXAM: ICD-10-CM

## 2024-10-11 DIAGNOSIS — M17.31 POST-TRAUMATIC OSTEOARTHRITIS OF RIGHT KNEE: ICD-10-CM

## 2024-10-11 DIAGNOSIS — Z00.00 MEDICARE WELCOME VISIT: Primary | ICD-10-CM

## 2024-10-11 PROBLEM — F41.1 GENERALIZED ANXIETY DISORDER: Status: RESOLVED | Noted: 2022-09-23 | Resolved: 2024-10-11

## 2024-10-11 PROCEDURE — G0438 PPPS, INITIAL VISIT: HCPCS | Performed by: INTERNAL MEDICINE

## 2024-10-11 RX ORDER — IBUPROFEN 200 MG
200 TABLET ORAL EVERY 6 HOURS PRN
COMMUNITY

## 2024-10-11 NOTE — PROGRESS NOTES
INTERNAL MEDICINE OFFICE VISIT       NAME: Orlando Hanna  AGE: 63 y.o. SEX: male       : 1960        MRN: [id number]    DATE: 10/11/2024  TIME: 2:04 PM    Assessment and Plan   1. Medicare welcome visit  -Patient with no acute complaints and recently retired presenting for Medicare welcome visit  -Patient has history of prostate cancer for which he follows with Urology; per chart PSA 0.15  -Patient denies headaches, chest pain, shortness of breath, palpitations, nausea, vomiting, abdominal pains, constipation.  -Endorsed prior history of diarrhea several months ago that has since resolved since removing peanuts from diet  -ON physical exam there are no over abnormalities  -Patient follows with dermatology; Paternal history of melanoma  -Welcome EKG shows normal sinus rhythm with rate 86    PLAN  -Patient agreeable to Flu vaccination and Pneumococcal vaccination for later date  -No current medical management changes  -Patient labs pending: HbA1c, UA, Lipid panel, CBC, CMP  -Patient to follow in 1 year    2. History of prostate cancer  -Current remission  -Follows with Urology    3. Post-traumatic osteoarthritis of right knee  -No significant limitations on mobility on physical exam  -Patient able to bear weight on leg and ambulate  -Patient exercises regularly    4. Screening for colon cancer  -Polyp found in Colonoscopy from 2019 with recommendation to follow up in 5 years  -Patient due to for repeat colonoscopy    PLAN  -GI referral         There are no Patient Instructions on file for this visit.        Chief Complaint   No chief complaint on file.      History of Present Illness   Orlando Hanna is a 63 y.o.-year-old male with past medical history of traumatic osteoarthritis of right knee and history of prostate cancer who presents for Medicare welcome visit.  Patient recently retired and started on Medicare last November, labs not performed as patient wanted to ensure Medicare established after this  welcome visit.  Patient has no acute complaints and endorses daily exercise.  Patient welcome EKG shows normal sinus rhythm.  Patient follows with Urology for PSA monitoring and Dermatology due to family history of Melanoma and patient fair skinned.  No changes in current medical management per this visit and patient to have repeat labs performed prior to next visit.    Review of Systems   Review of Systems   Constitutional:  Negative for fever.   HENT:  Negative for sore throat.    Eyes:  Negative for pain and visual disturbance.   Respiratory:  Negative for cough and shortness of breath.    Cardiovascular:  Negative for chest pain and palpitations.   Gastrointestinal:  Negative for abdominal pain and vomiting.   Genitourinary:  Negative for dysuria and hematuria.   Musculoskeletal:  Negative for arthralgias and back pain.   Skin:  Negative for color change and rash.   Neurological:  Negative for dizziness, light-headedness and headaches.   All other systems reviewed and are negative.      Active Problem List     Patient Active Problem List   Diagnosis    Erectile dysfunction following radical prostatectomy    History of prostate cancer    Overweight (BMI 25.0-29.9)    Post-traumatic osteoarthritis of right knee       The following portions of the patient's history were reviewed and updated as appropriate: allergies, current medications, past family history, past medical history, past social history, past surgical history, and problem list.    Objective     Vitals:    10/11/24 1340   BP: 122/74   Pulse: 64   SpO2: 98%     Wt Readings from Last 3 Encounters:   10/11/24 74.9 kg (165 lb 3.2 oz)   11/13/23 81.6 kg (180 lb)   11/07/23 82.1 kg (181 lb)       Physical Exam  Constitutional:       General: He is not in acute distress.  HENT:      Head: Normocephalic.   Cardiovascular:      Rate and Rhythm: Normal rate and regular rhythm.      Pulses: Normal pulses.      Heart sounds: Normal heart sounds. No murmur  heard.  Pulmonary:      Effort: Pulmonary effort is normal.      Breath sounds: Normal breath sounds. No wheezing.   Abdominal:      Palpations: Abdomen is soft.      Tenderness: There is no abdominal tenderness.   Skin:     General: Skin is warm and dry.      Capillary Refill: Capillary refill takes less than 2 seconds.   Neurological:      Mental Status: He is alert and oriented to person, place, and time.   Psychiatric:         Mood and Affect: Mood normal.         Pertinent Laboratory/Diagnostic Studies:  Most Recent Labs:   Lab Results   Component Value Date    WBC 6.0 09/18/2023    RBC 5.38 09/18/2023    HGB 15.7 09/18/2023    HCT 46.8 09/18/2023     09/18/2023    RDW 12.4 09/18/2023    NEUTROABS 3,798 09/18/2023    SODIUM 140 09/18/2023    K 4.5 09/18/2023     09/18/2023    CO2 28 09/18/2023    BUN 21 09/18/2023    CREATININE 1.15 09/18/2023    GLUC 103 (H) 09/18/2023    CALCIUM 9.2 09/18/2023    AST 16 09/18/2023    ALT 16 09/18/2023    ALKPHOS 78 09/18/2023    TP 6.5 09/18/2023    ALB 4.5 09/18/2023    TBILI 1.1 09/18/2023    CHOLESTEROL 170 09/20/2023    HDL 63 09/20/2023    TRIG 35 09/20/2023    LDLCALC 97 09/20/2023    NONHDLC 98 05/22/2017    HGBA1C 4.9 09/18/2023         No orders of the defined types were placed in this encounter.      ALLERGIES:  No Known Allergies    Current Medications     Current Outpatient Medications   Medication Sig Dispense Refill    ibuprofen (MOTRIN) 200 mg tablet Take 200 mg by mouth every 6 (six) hours as needed       No current facility-administered medications for this visit.         Health Maintenance        Mikel Zuniga MD

## 2024-10-11 NOTE — PROGRESS NOTES
Ambulatory Visit  Name: Orlando Hanna      : 1960      MRN: [id number]  Encounter Provider: Espinoza Zaidi MD  Encounter Date: 10/11/2024   Encounter department: [facility] INTERNAL MEDICINE    Assessment & Plan       Preventive health issues were discussed with patient, and age appropriate screening tests were ordered as noted in patient's After Visit Summary. Personalized health advice and appropriate referrals for health education or preventive services given if needed, as noted in patient's After Visit Summary.    History of Present Illness     HPI   Patient Care Team:  Espinoza Zaidi MD as PCP - General (Internal Medicine)  Adri Elizondo MD as PCP - PCP-[facility] Attributed-Roster    Review of Systems  Medical History Reviewed by provider this encounter:       Annual Wellness Visit Questionnaire   Orlando is here for his Welcome to Medicare visit.     Health Risk Assessment:   Patient rates overall health as good. Patient feels that their physical health rating is same. Patient is satisfied with their life. Eyesight was rated as same. Hearing was rated as same. Patient feels that their emotional and mental health rating is same. Patients states they are never, rarely angry. Patient states they are sometimes unusually tired/fatigued. Pain experienced in the last 7 days has been none.     Depression Screening:   PHQ-2 Score: 0  PHQ-9 Score: 0      Fall Risk Screening:   In the past year, patient has experienced: no history of falling in past year      Home Safety:  Patient does not have trouble with stairs inside or outside of their home. Patient has working smoke alarms and has working carbon monoxide detector. Home safety hazards include: none.     Nutrition:   Current diet is Regular.     Medications:   Patient is not currently taking any over-the-counter supplements. Patient is able to manage medications.     Activities of Daily Living (ADLs)/Instrumental Activities of Daily Living  (IADLs):   Walk and transfer into and out of bed and chair?: Yes  Dress and groom yourself?: Yes    Bathe or shower yourself?: Yes    Feed yourself? Yes  Do your laundry/housekeeping?: Yes  Manage your money, pay your bills and track your expenses?: Yes  Make your own meals?: Yes    Do your own shopping?: Yes    Previous Hospitalizations:   Any hospitalizations or ED visits within the last 12 months?: No      Advance Care Planning:   Living will: No    Durable POA for healthcare: No    Advanced directive: No      PREVENTIVE SCREENINGS      Cardiovascular Screening:    General: Screening Current      Colorectal Cancer Screening:     General: Screening Current      Prostate Cancer Screening:    General: History Prostate Cancer      Abdominal Aortic Aneurysm (AAA) Screening:    Risk factors include: age between 65-76 yo and tobacco use        Lung Cancer Screening:     General: Screening Not Indicated    Screening, Brief Intervention, and Referral to Treatment (SBIRT)    Screening    Typical number of drinks in a week: 6    Single Item Drug Screening:  How often have you used an illegal drug (including marijuana) or a prescription medication for non-medical reasons in the past year? never    Single Item Drug Screen Score: 0  Interpretation: Negative screen for possible drug use disorder       No results found.    Objective     There were no vitals taken for this visit.    Physical Exam

## 2024-10-15 LAB
ALBUMIN SERPL-MCNC: 4.2 G/DL (ref 3.6–5.1)
ALBUMIN/GLOB SERPL: 2.3 (CALC) (ref 1–2.5)
ALP SERPL-CCNC: 69 U/L (ref 35–144)
ALT SERPL-CCNC: 17 U/L (ref 9–46)
APPEARANCE UR: CLEAR
AST SERPL-CCNC: 16 U/L (ref 10–35)
BASOPHILS # BLD AUTO: 30 CELLS/UL (ref 0–200)
BASOPHILS NFR BLD AUTO: 0.4 %
BILIRUB SERPL-MCNC: 1.4 MG/DL (ref 0.2–1.2)
BILIRUB UR QL STRIP: NEGATIVE
BUN SERPL-MCNC: 18 MG/DL (ref 7–25)
BUN/CREAT SERPL: ABNORMAL (CALC) (ref 6–22)
CALCIUM SERPL-MCNC: 8.7 MG/DL (ref 8.6–10.3)
CHLORIDE SERPL-SCNC: 105 MMOL/L (ref 98–110)
CHOLEST SERPL-MCNC: 152 MG/DL
CHOLEST/HDLC SERPL: 2.4 (CALC)
CO2 SERPL-SCNC: 27 MMOL/L (ref 20–32)
COLOR UR: YELLOW
CREAT SERPL-MCNC: 1.18 MG/DL (ref 0.7–1.35)
EOSINOPHIL # BLD AUTO: 60 CELLS/UL (ref 15–500)
EOSINOPHIL NFR BLD AUTO: 0.8 %
ERYTHROCYTE [DISTWIDTH] IN BLOOD BY AUTOMATED COUNT: 12.2 % (ref 11–15)
GFR/BSA.PRED SERPLBLD CYS-BASED-ARV: 68 ML/MIN/1.73M2
GLOBULIN SER CALC-MCNC: 1.8 G/DL (CALC) (ref 1.9–3.7)
GLUCOSE SERPL-MCNC: 76 MG/DL (ref 65–99)
GLUCOSE UR QL STRIP: NEGATIVE
HBA1C MFR BLD: 5.2 % OF TOTAL HGB
HCT VFR BLD AUTO: 43.5 % (ref 38.5–50)
HDLC SERPL-MCNC: 64 MG/DL
HGB BLD-MCNC: 14.6 G/DL (ref 13.2–17.1)
HGB UR QL STRIP: NEGATIVE
KETONES UR QL STRIP: NEGATIVE
LDLC SERPL CALC-MCNC: 74 MG/DL (CALC)
LEUKOCYTE ESTERASE UR QL STRIP: NEGATIVE
LYMPHOCYTES # BLD AUTO: 2033 CELLS/UL (ref 850–3900)
LYMPHOCYTES NFR BLD AUTO: 27.1 %
MCH RBC QN AUTO: 30 PG (ref 27–33)
MCHC RBC AUTO-ENTMCNC: 33.6 G/DL (ref 32–36)
MCV RBC AUTO: 89.5 FL (ref 80–100)
MONOCYTES # BLD AUTO: 540 CELLS/UL (ref 200–950)
MONOCYTES NFR BLD AUTO: 7.2 %
NEUTROPHILS # BLD AUTO: 4838 CELLS/UL (ref 1500–7800)
NEUTROPHILS NFR BLD AUTO: 64.5 %
NITRITE UR QL STRIP: NEGATIVE
NONHDLC SERPL-MCNC: 88 MG/DL (CALC)
PH UR STRIP: NORMAL [PH] (ref 5–8)
PLATELET # BLD AUTO: 228 THOUSAND/UL (ref 140–400)
PMV BLD REES-ECKER: 10 FL (ref 7.5–12.5)
POTASSIUM SERPL-SCNC: 3.8 MMOL/L (ref 3.5–5.3)
PROT SERPL-MCNC: 6 G/DL (ref 6.1–8.1)
PROT UR QL STRIP: NEGATIVE
RBC # BLD AUTO: 4.86 MILLION/UL (ref 4.2–5.8)
SODIUM SERPL-SCNC: 139 MMOL/L (ref 135–146)
SP GR UR STRIP: 1.02 (ref 1–1.03)
TRIGL SERPL-MCNC: 49 MG/DL
WBC # BLD AUTO: 7.5 THOUSAND/UL (ref 3.8–10.8)

## 2024-10-15 NOTE — RESULT ENCOUNTER NOTE
Ida Morris,  Your labs are optimal overall.  The slightly low total protein, which is associated with the low globulin level, is due to aging.  This is not indicative of any medical disease.  The slight elevation of total bilirubin is due to fasting.  This is also a physiologic phenomenon.    -Dr. Espinoza Zaidi

## 2024-11-07 LAB — PSA SERPL-MCNC: 0.19 NG/ML

## 2024-11-21 ENCOUNTER — OFFICE VISIT (OUTPATIENT)
Dept: UROLOGY | Facility: MEDICAL CENTER | Age: 64
End: 2024-11-21
Payer: MEDICARE

## 2024-11-21 VITALS
BODY MASS INDEX: 24.44 KG/M2 | DIASTOLIC BLOOD PRESSURE: 76 MMHG | HEIGHT: 69 IN | WEIGHT: 165 LBS | SYSTOLIC BLOOD PRESSURE: 112 MMHG

## 2024-11-21 DIAGNOSIS — Z85.46 HISTORY OF PROSTATE CANCER: Primary | ICD-10-CM

## 2024-11-21 DIAGNOSIS — N52.31 ERECTILE DYSFUNCTION FOLLOWING RADICAL PROSTATECTOMY: ICD-10-CM

## 2024-11-21 PROCEDURE — 99213 OFFICE O/P EST LOW 20 MIN: CPT

## 2024-11-21 RX ORDER — CLONAZEPAM 1 MG/1
1 TABLET ORAL 2 TIMES DAILY PRN
COMMUNITY

## 2024-11-21 NOTE — PROGRESS NOTES
11/21/2024      Assessment and Plan    63 y.o. male managed by Dr. Minaya    History of prostate cancer  History of T2c [place] 3+3 equal 6 prostate cancer status post radical prostatectomy performed 2008.  Surgical margins were negative.  Patient's last PSA was performed 11/6/2024 and found to be 0.19.  Refer to PSA trend below.  We discussed repeating his PSA at shorter intervals to better trend and closely monitor his PSA.  Patient will obtain a repeat PSA in 6 months and in 1 year    Lab Results   Component Value Date    PSA 0.19 11/06/2024    PSA 0.16 09/18/2023    PSA 0.16 09/16/2022        Erectile dysfunction following radical prostatectomy  We discussed options of intracavernosal injection therapy and placement of a penile prosthesis.  However, patient defers either these treatment options for his erectile dysfunction at this time.        History of Present Illness  Orlando Hanna is a 63 y.o. male here for evaluation of history of T2c [place] 3+3 equal 6 prostate cancer status post radical prostatectomy performed in 2008.  Surgical margins were negative.  Patient does occasionally experience episodes of stress urinary incontinence.  Patient does not wear pads.  Patient did develop erectile dysfunction following his prostatectomy.  At his last office visit, the patient was discussed about potentially pursuing a penile prosthesis but he deferred at that time.  AUA symptom score 13.  Patient's most recent PSA was performed 11/6/2024 and found to be 0.19.  Today, the patient reports that he continues to occasionally experience stress urinary continence, but does not overly bothered by it at this time.  Furthermore, the patient notes that there has been no change in achieving an erection following his prostatectomy procedure.  Patient reports no new lower urinary tract symptoms and is currently content with his voiding pattern.        Review of Systems   Constitutional:  Negative for chills and fever.   HENT:   "Negative for ear pain and sore throat.    Eyes:  Negative for pain and visual disturbance.   Respiratory:  Negative for cough and shortness of breath.    Cardiovascular:  Negative for chest pain and palpitations.   Gastrointestinal:  Negative for abdominal pain and vomiting.   Genitourinary:  Negative for decreased urine volume, difficulty urinating, dysuria, flank pain, frequency, hematuria and urgency.   Musculoskeletal:  Negative for arthralgias and back pain.   Skin:  Negative for color change and rash.   Neurological:  Negative for seizures and syncope.   All other systems reviewed and are negative.          AUA SYMPTOM SCORE      Flowsheet Row Most Recent Value   AUA SYMPTOM SCORE    How often have you had a sensation of not emptying your bladder completely after you finished urinating? 1   How often have you had to urinate again less than two hours after you finished urinating? 2   How often have you found you stopped and started again several times when you urinate? 2   How often have you found it difficult to postpone urination? 2   How often have you had a weak urinary stream? 2   How often have you had to push or strain to begin urination? 2   How many times did you most typically get up to urinate from the time you went to bed at night until the time you got up in the morning? 2   Quality of Life: If you were to spend the rest of your life with your urinary condition just the way it is now, how would you feel about that? 2   AUA SYMPTOM SCORE 13             Vitals  Vitals:    11/21/24 1112   BP: 112/76   Weight: 74.8 kg (165 lb)   Height: 5' 9\" (1.753 m)       Physical Exam  Vitals reviewed.   Constitutional:       General: He is not in acute distress.     Appearance: Normal appearance. He is not ill-appearing.   HENT:      Head: Normocephalic and atraumatic.      Nose: Nose normal.   Eyes:      General: No scleral icterus.  Pulmonary:      Effort: No respiratory distress.   Abdominal:      General: " Abdomen is flat. There is no distension.      Palpations: Abdomen is soft.      Tenderness: There is no abdominal tenderness.   Musculoskeletal:         General: Normal range of motion.      Cervical back: Normal range of motion.   Skin:     General: Skin is warm.      Coloration: Skin is not jaundiced.   Neurological:      Mental Status: He is alert and oriented to person, place, and time.      Gait: Gait normal.   Psychiatric:         Mood and Affect: Mood normal.         Behavior: Behavior normal.           Past History  Past Medical History:   Diagnosis Date    Adenocarcinoma of prostate (HCC)     last assessed 05/12/2015    Bladder neck contracture     Colon polyps     last assessed 05/12/2015    Erectile dysfunction following radical prostatectomy     Feeling of incomplete bladder emptying     Squamous cell carcinoma of skin     last assessed 12/30/2016     Social History     Socioeconomic History    Marital status: /Civil Union     Spouse name: None    Number of children: None    Years of education: None    Highest education level: None   Occupational History    None   Tobacco Use    Smoking status: Former    Smokeless tobacco: Never   Substance and Sexual Activity    Alcohol use: Yes    Drug use: No    Sexual activity: None   Other Topics Concern    None   Social History Narrative    None     Social Drivers of Health     Financial Resource Strain: Not on file   Food Insecurity: No Food Insecurity (10/11/2024)    Nursing - Inadequate Food Risk Classification     Worried About Running Out of Food in the Last Year: Never true     Ran Out of Food in the Last Year: Never true     Ran Out of Food in the Last Year: Not on file   Transportation Needs: No Transportation Needs (10/11/2024)    PRAPARE - Transportation     Lack of Transportation (Medical): No     Lack of Transportation (Non-Medical): No   Physical Activity: Not on file   Stress: Not on file   Social Connections: Not on file   Intimate Partner  Violence: Not on file   Housing Stability: Low Risk  (10/11/2024)    Housing Stability Vital Sign     Unable to Pay for Housing in the Last Year: No     Number of Times Moved in the Last Year: 1     Homeless in the Last Year: No     Social History     Tobacco Use   Smoking Status Former   Smokeless Tobacco Never     Family History   Problem Relation Age of Onset    Coronary artery disease Mother     Coronary artery disease Father     Melanoma Father        The following portions of the patient's history were reviewed and updated as appropriate: allergies, current medications, past medical history, past social history, past surgical history and problem list.    Results  No results found for this or any previous visit (from the past hour).]  Lab Results   Component Value Date    PSA 0.19 11/06/2024    PSA 0.16 09/18/2023    PSA 0.16 09/16/2022    PSA 0.13 09/15/2021     Lab Results   Component Value Date    GLUCOSE 93 06/23/2014    CALCIUM 8.7 10/14/2024     05/22/2017    K 3.8 10/14/2024    CO2 27 10/14/2024     10/14/2024    BUN 18 10/14/2024    CREATININE 1.18 10/14/2024     Lab Results   Component Value Date    WBC 7.5 10/14/2024    HGB 14.6 10/14/2024    HCT 43.5 10/14/2024    MCV 89.5 10/14/2024     10/14/2024

## 2024-11-24 NOTE — ASSESSMENT & PLAN NOTE
History of T2c [place] 3+3 equal 6 prostate cancer status post radical prostatectomy performed 2008.  Surgical margins were negative.  Patient's last PSA was performed 11/6/2024 and found to be 0.19.  Refer to PSA trend below.  We discussed repeating his PSA at shorter intervals to better trend and closely monitor his PSA.  Patient will obtain a repeat PSA in 6 months and in 1 year    Lab Results   Component Value Date    PSA 0.19 11/06/2024    PSA 0.16 09/18/2023    PSA 0.16 09/16/2022

## 2024-11-24 NOTE — ASSESSMENT & PLAN NOTE
We discussed options of intracavernosal injection therapy and placement of a penile prosthesis.  However, patient defers either these treatment options for his erectile dysfunction at this time.

## 2025-01-31 ENCOUNTER — APPOINTMENT (OUTPATIENT)
Dept: RADIOLOGY | Age: 65
End: 2025-01-31
Payer: MEDICARE

## 2025-01-31 ENCOUNTER — OFFICE VISIT (OUTPATIENT)
Dept: URGENT CARE | Age: 65
End: 2025-01-31
Payer: MEDICARE

## 2025-01-31 VITALS
BODY MASS INDEX: 23.7 KG/M2 | WEIGHT: 160 LBS | TEMPERATURE: 97.5 F | HEIGHT: 69 IN | HEART RATE: 77 BPM | RESPIRATION RATE: 18 BRPM | SYSTOLIC BLOOD PRESSURE: 130 MMHG | OXYGEN SATURATION: 96 % | DIASTOLIC BLOOD PRESSURE: 70 MMHG

## 2025-01-31 DIAGNOSIS — M25.512 LEFT SHOULDER PAIN, UNSPECIFIED CHRONICITY: ICD-10-CM

## 2025-01-31 DIAGNOSIS — S43.402A SPRAIN OF LEFT SHOULDER, UNSPECIFIED SHOULDER SPRAIN TYPE, INITIAL ENCOUNTER: Primary | ICD-10-CM

## 2025-01-31 PROCEDURE — 99213 OFFICE O/P EST LOW 20 MIN: CPT | Performed by: EMERGENCY MEDICINE

## 2025-01-31 PROCEDURE — G0463 HOSPITAL OUTPT CLINIC VISIT: HCPCS | Performed by: EMERGENCY MEDICINE

## 2025-01-31 PROCEDURE — 73030 X-RAY EXAM OF SHOULDER: CPT

## 2025-01-31 NOTE — PROGRESS NOTES
"[facility]        NAME: Orlando Hanna is a 64 y.o. male  : 1960    MRN: [id number]  DATE: 2025  TIME: 1:32 PM    /70   Pulse 77   Temp 97.5 °F (36.4 °C)   Resp 18   Ht 5' 9\" (1.753 m)   Wt 72.6 kg (160 lb)   SpO2 96%   BMI 23.63 kg/m²     Assessment and Plan   Sprain of left shoulder, unspecified shoulder sprain type, initial encounter [S43.402A]  1. Sprain of left shoulder, unspecified shoulder sprain type, initial encounter  XR shoulder 2+ vw left    Sling            Patient Instructions       Follow up with PCP in 3-5 days.  Proceed to  ER if symptoms worsen.    Chief Complaint     Chief Complaint   Patient presents with    Shoulder Injury     Pt fell on ice this morning, did not hit head and no LOC. Left shoulder pain.          History of Present Illness       Pt with slip and fall onto left shoulder this morning     Shoulder Injury         Review of Systems   Review of Systems   Constitutional: Negative.    HENT: Negative.     Eyes: Negative.    Respiratory: Negative.     Cardiovascular: Negative.    Gastrointestinal: Negative.    Endocrine: Negative.    Genitourinary: Negative.    Musculoskeletal: Negative.    Skin: Negative.    Allergic/Immunologic: Negative.    Neurological: Negative.    Hematological: Negative.    Psychiatric/Behavioral: Negative.     All other systems reviewed and are negative.        Current Medications       Current Outpatient Medications:     clonazePAM (KlonoPIN) 1 mg tablet, Take 1 mg by mouth 2 (two) times a day as needed for seizures (Patient not taking: Reported on 2025), Disp: , Rfl:     ibuprofen (MOTRIN) 200 mg tablet, Take 200 mg by mouth every 6 (six) hours as needed (Patient not taking: Reported on 2025), Disp: , Rfl:     Current Allergies     Allergies as of 2025    (No Known Allergies)            The following portions of the patient's history were reviewed and updated as appropriate: allergies, current medications, " "past family history, past medical history, past social history, past surgical history and problem list.     Past Medical History:   Diagnosis Date    Adenocarcinoma of prostate (HCC)     last assessed 05/12/2015    Bladder neck contracture     Colon polyps     last assessed 05/12/2015    Erectile dysfunction following radical prostatectomy     Feeling of incomplete bladder emptying     Squamous cell carcinoma of skin     last assessed 12/30/2016       Past Surgical History:   Procedure Laterality Date    ARTHROSCOPY ANKLE  2010    BACK SURGERY      COLONOSCOPY      last assessed 05/12/2015    KNEE SURGERY Right     last assessed 06/06/2016    PROSTATECTOMY  2008    last assessed 05/12/2015       Family History   Problem Relation Age of Onset    Coronary artery disease Mother     Coronary artery disease Father     Melanoma Father          Medications have been verified.        Objective   /70   Pulse 77   Temp 97.5 °F (36.4 °C)   Resp 18   Ht 5' 9\" (1.753 m)   Wt 72.6 kg (160 lb)   SpO2 96%   BMI 23.63 kg/m²        Physical Exam     Physical Exam  Vitals and nursing note reviewed.   Constitutional:       Appearance: Normal appearance. He is normal weight.      Comments: Discussed with pt need to to orthopedist  pt has appt set up with his shoulder specialist    HENT:      Head: Normocephalic and atraumatic.   Cardiovascular:      Rate and Rhythm: Normal rate and regular rhythm.      Pulses: Normal pulses.      Heart sounds: Normal heart sounds.   Pulmonary:      Effort: Pulmonary effort is normal.      Breath sounds: Normal breath sounds.      Comments: No rib tenderness   Abdominal:      Palpations: Abdomen is soft.   Musculoskeletal:         General: Normal range of motion.      Cervical back: Normal range of motion and neck supple.      Comments: Left shoulder pain  humeral  head pain  too painful for rom   no ac joint pain  no clavicle pain   Distal neuro and vascular wnl   no ac joint pain  "   Skin:     General: Skin is warm.      Capillary Refill: Capillary refill takes less than 2 seconds.   Neurological:      Mental Status: He is alert and oriented to person, place, and time.

## 2025-05-22 ENCOUNTER — RESULTS FOLLOW-UP (OUTPATIENT)
Dept: UROLOGY | Facility: MEDICAL CENTER | Age: 65
End: 2025-05-22

## 2025-05-22 LAB — PSA SERPL-MCNC: 0.21 NG/ML
